# Patient Record
Sex: MALE | Race: WHITE | Employment: OTHER | URBAN - METROPOLITAN AREA
[De-identification: names, ages, dates, MRNs, and addresses within clinical notes are randomized per-mention and may not be internally consistent; named-entity substitution may affect disease eponyms.]

---

## 2017-01-03 ENCOUNTER — RADIANT APPOINTMENT (OUTPATIENT)
Dept: GENERAL RADIOLOGY | Facility: CLINIC | Age: 57
End: 2017-01-03
Attending: ORTHOPAEDIC SURGERY
Payer: COMMERCIAL

## 2017-01-03 ENCOUNTER — OFFICE VISIT (OUTPATIENT)
Dept: ORTHOPEDICS | Facility: CLINIC | Age: 57
End: 2017-01-03
Payer: COMMERCIAL

## 2017-01-03 VITALS — WEIGHT: 233 LBS | BODY MASS INDEX: 31.56 KG/M2 | HEIGHT: 72 IN | TEMPERATURE: 98.4 F

## 2017-01-03 DIAGNOSIS — M16.12 PRIMARY OSTEOARTHRITIS OF LEFT HIP: ICD-10-CM

## 2017-01-03 DIAGNOSIS — M16.12 PRIMARY OSTEOARTHRITIS OF LEFT HIP: Primary | ICD-10-CM

## 2017-01-03 PROCEDURE — 99207 ZZC PREOP VISIT IN GLOBAL PKG: CPT | Performed by: PHYSICIAN ASSISTANT

## 2017-01-03 PROCEDURE — 73502 X-RAY EXAM HIP UNI 2-3 VIEWS: CPT | Mod: TC

## 2017-01-03 RX ORDER — GABAPENTIN 300 MG/1
CAPSULE ORAL
Qty: 30 CAPSULE | Refills: 1 | Status: SHIPPED | OUTPATIENT
Start: 2017-01-03 | End: 2017-01-16

## 2017-01-03 ASSESSMENT — PAIN SCALES - GENERAL: PAINLEVEL: NO PAIN (0)

## 2017-01-03 NOTE — MR AVS SNAPSHOT
After Visit Summary   1/3/2017    Ethan Espinosa    MRN: [id number]           Patient Information     Date Of Birth          1960        Visit Information        Provider Department      1/3/2017 8:20 AM Florentino Lawrence MD [facility]        Today's Diagnoses     Primary osteoarthritis of left hip    -  1        Follow-ups after your visit        Your next 10 appointments already scheduled     Jan 09, 2017   Procedure with Florentino Lawrence MD   [facility] ([facility])    [address] [place] [id number] [phone]           From [address]: Exit at [employer] on south side of [place]. Turn right on [address]. Turn left at stoplight on [address]. [facility] will be in view two blocks ahead            Jan 23, 2017  1:20 PM   Return Visit with Florentino Lawrence MD   [facility] ([facility]    [address] [place] [id number] [phone]            Jan 27, 2017  1:50 PM   SHORT with Gregory G Schoen, MD   [facility] ([facility])    [address] [place] [id number] [phone]              Who to contact     If you have questions or need follow up information about today's clinic visit or your schedule please contact [facility] directly at [phone].  Normal or non-critical lab and imaging results will be communicated to you by MyChart, letter or phone within 4 business days after the clinic has received the results. If you do not hear from us within 7 days, please contact the clinic through MyChart or phone. If you have a critical or abnormal lab result, we will notify you by phone as soon as possible.  Submit refill requests through [email or web address] or call your pharmacy and they will forward the refill request to us. Please allow 3 business days for your refill to be completed.           "Additional Information About Your Visit        MyChart Information     [employer] lets you send messages to your doctor, view your test results, renew your prescriptions, schedule appointments and more. To sign up, go to [email or web address] . Click on \"Log in\" on the left side of the screen, which will take you to the Welcome page. Then click on \"Sign up Now\" on the right side of the page.     You will be asked to enter the access code listed below, as well as some personal information. Please follow the directions to create your username and password.     Your access code is: [email or web address]  Expires: 2017  2:09 PM     Your access code will  in 90 days. If you need help or a new code, please call your [place] clinic or [phone].        Your Vitals Were     Temperature Height BMI (Body Mass Index)             98.4  F (36.9  C) (Temporal) 1.829 m (6') 31.59 kg/m2          Blood Pressure from Last 3 Encounters:   16 160/90   16 169/86   02/25/15 164/102    Weight from Last 3 Encounters:   17 105.688 kg (233 lb)   16 105.96 kg (233 lb 9.6 oz)   16 110.224 kg (243 lb)               Primary Care Provider    None Specified       No primary provider on file.        Thank you!     Thank you for choosing [facility]  for your care. Our goal is always to provide you with excellent care. Hearing back from our patients is one way we can continue to improve our services. Please take a few minutes to complete the written survey that you may receive in the mail after your visit with us. Thank you!             Your Updated Medication List - Protect others around you: Learn how to safely use, store and throw away your medicines at www.disposemymeds.org.          This list is accurate as of: 1/3/17  8:24 AM.  Always use your most recent med list.                   Brand Name Dispense Instructions for use    amLODIPine 5 MG tablet    NORVASC    90 tablet    Take 1 tablet (5 " mg) by mouth daily       ANDROGEL 1% PUMP 12.5 MG/ACT (1%) gel   Generic drug:  testosterone     1 Month    Apply 1gm every morning       ibuprofen 600 MG tablet    ADVIL/MOTRIN    90 tablet    Take 1 tablet (600 mg) by mouth 3 times daily Take for 10 days consistently then PRN thereafter.       meloxicam 7.5 MG tablet    MOBIC    30 tablet    Take 1 tablet (7.5 mg) by mouth daily       * order for DME      1 Device Respironics Auto CPAP @@ 7-12 cm via nasal mask per order of Dr. Headley.       * order for DME     1 unit marking on an U-100 insulin syringe    Equipment being ordered: TENS       sildenafil 100 MG cap/tab    VIAGRA    4 tablet    Take 1 tablet (100 mg) by mouth daily as needed for erectile dysfunction Take 30 min to 4 hours before intercourse.  Never use with nitroglycerin, terazosin or doxazosin.       traMADol 50 MG tablet    ULTRAM    40 tablet    Take 1-2 tablets ( mg) by mouth every 6 hours as needed for breakthrough pain or pain       VITAMIN D (CHOLECALCIFEROL) PO      Take 1,000 Units by mouth daily       * Notice:  This list has 2 medication(s) that are the same as other medications prescribed for you. Read the directions carefully, and ask your doctor or other care provider to review them with you.

## 2017-01-03 NOTE — NURSING NOTE
Chief Complaint   Patient presents with     RECHECK     1 week prior to total Left hip.  DOS: Planned for 1/9/17       Initial Temp(Src) 98.4  F (36.9  C) (Temporal)  Ht 1.829 m (6')  Wt 105.688 kg (233 lb)  BMI 31.59 kg/m2 Estimated body mass index is 31.59 kg/(m^2) as calculated from the following:    Height as of this encounter: 1.829 m (6').    Weight as of this encounter: 105.688 kg (233 lb).  BP completed using cuff size: NA (Not Taken)  VADIM Grant

## 2017-01-03 NOTE — PROGRESS NOTES
HISTORY OF PRESENT ILLNESS:    Ethan Espionsa is a 56 year old male who is seen in follow up for   Chief Complaint   Patient presents with     RECHECK     1 week prior to total Left hip.  DOS: Planned for 1/9/17   Present symptoms: Patient here for visit prior to total joint.  He has attended the joint replacement education program.  Patient did state he is cut some swelling of the right knee that he has been utilizing a brace for.  Patient does have questions about infection control as he did have a previous back surgery in which got infected.  Treatments tried to this point: Pain medications.  Patient evaluation done with Dr. Lawrence    Physical Exam:  Vitals: Temp(Src) 98.4  F (36.9  C) (Temporal)  Ht 1.829 m (6')  Wt 105.688 kg (233 lb)  BMI 31.59 kg/m2  BMI= Body mass index is 31.59 kg/(m^2).  Constitutional: healthy, alert and no acute distress   Psychiatric: mentation appears normal and affect normal/bright  NEURO: no focal deficits  SKIN: no excoriation or erythema. No signs of infection.  JOINT/EXTREMITIES:  Affected extremity pulses are easily palpable.  Left hip  Patient is sitting on the edge of the exam table with legs extended. He has a difficult time sitting in 90  flexion in a chair.  GAIT: Antalgic    IMAGING INTERPRETATION:  X-ray images do reveal narrowing of the acetabulum to the femoral head  Independent visualization of the images was performed.     LAB:  Nares, CBC and UA were all reviewed. There is no bacteria present.   There is blood present in the urinary analysis.    ASSESSMENT:    Chief Complaint   Patient presents with     RECHECK     1 week prior to total Left hip.  DOS: Planned for 1/9/17       ICD-10-CM    1. Primary osteoarthritis of left hip M16.12 XR Hip Left 2-3 Views     gabapentin (NEURONTIN) 300 MG capsule       Patient is 1 week prior to left total hip arthroplasty..  He has completed the requirements necessary to proceed.    Plan:   Patient is advised of negative lab  results for bacteria. He is also advised of the use of cleaning of the room and space suits that are utilized within the surgery. Patient also has instruction for multiple showering prior to his surgery  Medications needed prior to surgery include: Gabapentin and meloxicam to begin Thursday prior to surgery  Patient states for postop pain control oxycodone 5 mg works better than Norco. The plain oxycodone also works a little better than Percocet with the Tylenol already in it  Patient with sleep apnea and utilizes C Pap machine. Patient is asked to bring that C Pap machine in for his hospitalization  DVT prophylaxis will be:  aspirin 325mg    Patient has been informed of therapy at home for 2 weeks following hospital discharge, than will attend outpatient therapy if needed   Patient plans for discharge to:  Home with support     Patient was given opportunities for questions.  Next office follow up will be 10-14 days from discharge from hospital.    Sherry Moreno PA-C   1/3/2017  8:58 AM    I didn't specifically address his use of C Pap. He did not require following his spine surgery, he did not require following his previous knee arthroscopy. For these reasons I did not think it was necessary for him to bring C Pap with him to the hospital.    I attest I have seen and evaluated the patient.  I agree with above impression and plan.    Florentino Lawrence MD

## 2017-01-09 ENCOUNTER — APPOINTMENT (OUTPATIENT)
Dept: PHYSICAL THERAPY | Facility: CLINIC | Age: 57
DRG: 470 | End: 2017-01-09
Attending: ORTHOPAEDIC SURGERY
Payer: COMMERCIAL

## 2017-01-09 ENCOUNTER — ANESTHESIA EVENT (OUTPATIENT)
Dept: SURGERY | Facility: CLINIC | Age: 57
DRG: 470 | End: 2017-01-09
Payer: COMMERCIAL

## 2017-01-09 ENCOUNTER — APPOINTMENT (OUTPATIENT)
Dept: GENERAL RADIOLOGY | Facility: CLINIC | Age: 57
DRG: 470 | End: 2017-01-09
Attending: ORTHOPAEDIC SURGERY
Payer: COMMERCIAL

## 2017-01-09 ENCOUNTER — HOSPITAL ENCOUNTER (OUTPATIENT)
Dept: GENERAL RADIOLOGY | Facility: CLINIC | Age: 57
DRG: 470 | End: 2017-01-09
Attending: ORTHOPAEDIC SURGERY | Admitting: ORTHOPAEDIC SURGERY
Payer: COMMERCIAL

## 2017-01-09 ENCOUNTER — ANESTHESIA (OUTPATIENT)
Dept: SURGERY | Facility: CLINIC | Age: 57
DRG: 470 | End: 2017-01-09
Payer: COMMERCIAL

## 2017-01-09 DIAGNOSIS — M25.552 PAIN OF LEFT HIP JOINT: ICD-10-CM

## 2017-01-09 PROBLEM — Z96.649 S/P TOTAL HIP ARTHROPLASTY: Status: ACTIVE | Noted: 2017-01-09

## 2017-01-09 PROCEDURE — 25800025 ZZH RX 258: Performed by: NURSE ANESTHETIST, CERTIFIED REGISTERED

## 2017-01-09 PROCEDURE — 25000125 ZZHC RX 250: Performed by: NURSE ANESTHETIST, CERTIFIED REGISTERED

## 2017-01-09 PROCEDURE — 25000125 ZZHC RX 250: Performed by: PHYSICIAN ASSISTANT

## 2017-01-09 PROCEDURE — S0077 INJECTION, CLINDAMYCIN PHOSP: HCPCS | Performed by: PHYSICIAN ASSISTANT

## 2017-01-09 PROCEDURE — 40000940 XR PELVIS PORT 1/2 VW

## 2017-01-09 PROCEDURE — 40000940 XR PELVIS AND HIP PORTABLE LEFT 1 VIEW

## 2017-01-09 RX ORDER — FENTANYL CITRATE 50 UG/ML
INJECTION, SOLUTION INTRAMUSCULAR; INTRAVENOUS PRN
Status: DISCONTINUED | OUTPATIENT
Start: 2017-01-09 | End: 2017-01-09

## 2017-01-09 RX ORDER — PROPOFOL 10 MG/ML
INJECTION, EMULSION INTRAVENOUS PRN
Status: DISCONTINUED | OUTPATIENT
Start: 2017-01-09 | End: 2017-01-09

## 2017-01-09 RX ORDER — GLYCOPYRROLATE 0.2 MG/ML
INJECTION, SOLUTION INTRAMUSCULAR; INTRAVENOUS PRN
Status: DISCONTINUED | OUTPATIENT
Start: 2017-01-09 | End: 2017-01-09

## 2017-01-09 RX ORDER — ONDANSETRON 2 MG/ML
INJECTION INTRAMUSCULAR; INTRAVENOUS PRN
Status: DISCONTINUED | OUTPATIENT
Start: 2017-01-09 | End: 2017-01-09

## 2017-01-09 RX ORDER — NEOSTIGMINE METHYLSULFATE 1 MG/ML
VIAL (ML) INJECTION PRN
Status: DISCONTINUED | OUTPATIENT
Start: 2017-01-09 | End: 2017-01-09

## 2017-01-09 RX ORDER — EPHEDRINE SULFATE 50 MG/ML
INJECTION, SOLUTION INTRAMUSCULAR; INTRAVENOUS; SUBCUTANEOUS PRN
Status: DISCONTINUED | OUTPATIENT
Start: 2017-01-09 | End: 2017-01-09

## 2017-01-09 RX ADMIN — PROPOFOL 50 MG: 10 INJECTION, EMULSION INTRAVENOUS at 10:28

## 2017-01-09 RX ADMIN — CLINDAMYCIN PHOSPHATE 900 MG: 18 INJECTION, SOLUTION INTRAVENOUS at 10:30

## 2017-01-09 RX ADMIN — MIDAZOLAM HYDROCHLORIDE 2 MG: 1 INJECTION, SOLUTION INTRAMUSCULAR; INTRAVENOUS at 10:19

## 2017-01-09 RX ADMIN — Medication 10 MG: at 12:12

## 2017-01-09 RX ADMIN — FENTANYL CITRATE 100 MCG: 50 INJECTION, SOLUTION INTRAMUSCULAR; INTRAVENOUS at 12:44

## 2017-01-09 RX ADMIN — Medication 5 MG: at 11:28

## 2017-01-09 RX ADMIN — SODIUM CHLORIDE, POTASSIUM CHLORIDE, SODIUM LACTATE AND CALCIUM CHLORIDE: 600; 310; 30; 20 INJECTION, SOLUTION INTRAVENOUS at 11:24

## 2017-01-09 RX ADMIN — Medication 10 MG: at 11:31

## 2017-01-09 RX ADMIN — ONDANSETRON 4 MG: 2 INJECTION INTRAMUSCULAR; INTRAVENOUS at 12:16

## 2017-01-09 RX ADMIN — HYDROMORPHONE HYDROCHLORIDE 0.5 MG: 10 INJECTION, SOLUTION INTRAMUSCULAR; INTRAVENOUS; SUBCUTANEOUS at 12:42

## 2017-01-09 RX ADMIN — FENTANYL CITRATE 50 MCG: 50 INJECTION, SOLUTION INTRAMUSCULAR; INTRAVENOUS at 10:46

## 2017-01-09 RX ADMIN — HYDROMORPHONE HYDROCHLORIDE 0.5 MG: 10 INJECTION, SOLUTION INTRAMUSCULAR; INTRAVENOUS; SUBCUTANEOUS at 12:25

## 2017-01-09 RX ADMIN — ROCURONIUM BROMIDE 50 MG: 10 INJECTION INTRAVENOUS at 10:29

## 2017-01-09 RX ADMIN — FENTANYL CITRATE 100 MCG: 50 INJECTION, SOLUTION INTRAMUSCULAR; INTRAVENOUS at 11:23

## 2017-01-09 RX ADMIN — GLYCOPYRROLATE 0.8 MG: 0.2 INJECTION, SOLUTION INTRAMUSCULAR; INTRAVENOUS at 12:28

## 2017-01-09 RX ADMIN — PROPOFOL 50 MG: 10 INJECTION, EMULSION INTRAVENOUS at 10:29

## 2017-01-09 RX ADMIN — PROPOFOL 200 MG: 10 INJECTION, EMULSION INTRAVENOUS at 10:27

## 2017-01-09 RX ADMIN — NEOSTIGMINE METHYLSULFATE 5 MG: 1 INJECTION INTRAMUSCULAR; INTRAVENOUS; SUBCUTANEOUS at 12:28

## 2017-01-09 RX ADMIN — FENTANYL CITRATE 50 MCG: 50 INJECTION, SOLUTION INTRAMUSCULAR; INTRAVENOUS at 10:27

## 2017-01-09 NOTE — ANESTHESIA PREPROCEDURE EVALUATION
Anesthesia Evaluation     . Pt has had prior anesthetic. Type: General      ROS/MED HX    ENT/Pulmonary:     (+)sleep apnea, uses CPAP , . .    Neurologic:  - neg neurologic ROS     Cardiovascular:     (+) hypertension----. : . . . :. . Previous cardiac testing date:results:date: results:ECG reviewed date: results:SB date: results:          METS/Exercise Tolerance:     Hematologic:  - neg hematologic  ROS       Musculoskeletal:  - neg musculoskeletal ROS       GI/Hepatic:  - neg GI/hepatic ROS       Renal/Genitourinary:  - ROS Renal section negative       Endo:  - neg endo ROS   (+) Obesity, .      Psychiatric:  - neg psychiatric ROS       Infectious Disease:  - neg infectious disease ROS       Malignancy:      - no malignancy   Other:    - neg other ROS           Physical Exam  Normal systems: cardiovascular, pulmonary and dental    Airway   Mallampati: I  TM distance: >3 FB  Neck ROM: full    Dental     Cardiovascular   Rhythm and rate: regular and normal      Pulmonary    breath sounds clear to auscultation                    Anesthesia Plan      History & Physical Review  History and physical reviewed and following examination; no interval change.    ASA Status:  2 .    NPO Status:  > 8 hours    Plan for General and ETT with Intravenous and Propofol induction. Maintenance will be Inhalation.    PONV prophylaxis:  Ondansetron (or other 5HT-3) and Meclizine or Dimenhydrinate       Postoperative Care  Postoperative pain management:  IV analgesics and Oral pain medications.      Consents  Anesthetic plan, risks, benefits and alternatives discussed with:  Patient..                          .

## 2017-01-09 NOTE — ANESTHESIA CARE TRANSFER NOTE
Patient: Ethan Espinosa    ARTHROPLASTY HIP (Left Hip)  Additional InformationProcedure(s):  Left total hip Arthroplasty - Wound Class: I-Clean    Diagnosis: arthralgia of hip, unspecified laterality  Diagnosis Additional Information: No value filed.    Anesthesia Type:   General     Note:  Airway :Nasal Cannula  Patient transferred to:PACU        Vitals: (Last set prior to Anesthesia Care Transfer)              Electronically Signed By: YAZMIN Shipley CRNA  January 9, 2017  12:50 PM

## 2017-01-10 ENCOUNTER — APPOINTMENT (OUTPATIENT)
Dept: OCCUPATIONAL THERAPY | Facility: CLINIC | Age: 57
DRG: 470 | End: 2017-01-10
Attending: ORTHOPAEDIC SURGERY
Payer: COMMERCIAL

## 2017-01-10 ENCOUNTER — APPOINTMENT (OUTPATIENT)
Dept: PHYSICAL THERAPY | Facility: CLINIC | Age: 57
DRG: 470 | End: 2017-01-10
Attending: ORTHOPAEDIC SURGERY
Payer: COMMERCIAL

## 2017-01-10 NOTE — ANESTHESIA POSTPROCEDURE EVALUATION
Patient: Ethan Espinosa    ARTHROPLASTY HIP (Left Hip)  Additional InformationProcedure(s):  Left total hip Arthroplasty - Wound Class: I-Clean    Diagnosis:arthralgia of hip, unspecified laterality  Diagnosis Additional Information: No value filed.    Anesthesia Type:  General    Note:  Anesthesia Post Evaluation    Patient location during evaluation: Bedside  Patient participation: Able to fully participate in evaluation  Level of consciousness: awake and alert  Pain management: adequate  Airway patency: patent  Cardiovascular status: acceptable  Respiratory status: acceptable  Hydration status: acceptable  PONV: none     Anesthetic complications: None          Last vitals:  Filed Vitals:    01/09/17 2338 01/10/17 0345 01/10/17 0722   BP: 129/65 132/78 139/57   Pulse: 70 69 64   Temp: 98  F (36.7  C) 97.7  F (36.5  C) 98  F (36.7  C)   Resp: 16 18 18   SpO2: 97% 97% 99%       Electronically Signed By: YAZMIN Gayle CRNA  January 10, 2017  4:06 PM

## 2017-01-11 ENCOUNTER — TELEPHONE (OUTPATIENT)
Dept: FAMILY MEDICINE | Facility: CLINIC | Age: 57
End: 2017-01-11

## 2017-01-11 NOTE — TELEPHONE ENCOUNTER
Pt is to follow up with Dr Larwence related to this hospital stay. Will close encounter and follow up as needed. Pascale [place] RN, BSN

## 2017-01-11 NOTE — TELEPHONE ENCOUNTER
Inpatient Visit Date: 1/10/17, [facility]  Diagnosis / Reason for Visit: Arthralgia of hip, unspecified laterality, S/P total hip arthroplasty

## 2017-01-12 ENCOUNTER — TELEPHONE (OUTPATIENT)
Dept: ORTHOPEDICS | Facility: CLINIC | Age: 57
End: 2017-01-12

## 2017-01-12 NOTE — TELEPHONE ENCOUNTER
Reason for Call:  Other FYI    Detailed comments: CAN NOT DO START UP CARE UNTIL TOMORROW. PATIENT IS NOT PLOWED OUT, UNTIL TOMORROW. NO NEED TO CALL.    Phone Number Patient can be reached at: Other phone number:  [phone]*    Best Time: NA    Can we leave a detailed message on this number? YES    Call taken on 1/12/2017 at 3:42 PM by Nina Armijo

## 2017-01-16 ENCOUNTER — OFFICE VISIT (OUTPATIENT)
Dept: ORTHOPEDICS | Facility: CLINIC | Age: 57
End: 2017-01-16
Payer: COMMERCIAL

## 2017-01-16 ENCOUNTER — TELEPHONE (OUTPATIENT)
Dept: ORTHOPEDICS | Facility: CLINIC | Age: 57
End: 2017-01-16

## 2017-01-16 VITALS — WEIGHT: 233 LBS | TEMPERATURE: 96.8 F | BODY MASS INDEX: 31.56 KG/M2 | HEIGHT: 72 IN

## 2017-01-16 DIAGNOSIS — R60.9 EDEMA, UNSPECIFIED TYPE: ICD-10-CM

## 2017-01-16 DIAGNOSIS — R21 RASH, SKIN: ICD-10-CM

## 2017-01-16 DIAGNOSIS — Z96.642 STATUS POST TOTAL REPLACEMENT OF LEFT HIP: Primary | ICD-10-CM

## 2017-01-16 LAB
BASOPHILS # BLD AUTO: 0 10E9/L (ref 0–0.2)
BASOPHILS NFR BLD AUTO: 0.4 %
CRP SERPL-MCNC: 55.4 MG/L (ref 0–8)
DIFFERENTIAL METHOD BLD: ABNORMAL
EOSINOPHIL # BLD AUTO: 0.4 10E9/L (ref 0–0.7)
EOSINOPHIL NFR BLD AUTO: 4 %
ERYTHROCYTE [DISTWIDTH] IN BLOOD BY AUTOMATED COUNT: 12.7 % (ref 10–15)
ERYTHROCYTE [SEDIMENTATION RATE] IN BLOOD BY WESTERGREN METHOD: 47 MM/H (ref 0–20)
HCT VFR BLD AUTO: 36.1 % (ref 40–53)
HGB BLD-MCNC: 12 G/DL (ref 13.3–17.7)
IMM GRANULOCYTES # BLD: 0 10E9/L (ref 0–0.4)
IMM GRANULOCYTES NFR BLD: 0.3 %
LYMPHOCYTES # BLD AUTO: 2 10E9/L (ref 0.8–5.3)
LYMPHOCYTES NFR BLD AUTO: 20.8 %
MCH RBC QN AUTO: 29.3 PG (ref 26.5–33)
MCHC RBC AUTO-ENTMCNC: 33.2 G/DL (ref 31.5–36.5)
MCV RBC AUTO: 88 FL (ref 78–100)
MONOCYTES # BLD AUTO: 0.6 10E9/L (ref 0–1.3)
MONOCYTES NFR BLD AUTO: 6.4 %
NEUTROPHILS # BLD AUTO: 6.7 10E9/L (ref 1.6–8.3)
NEUTROPHILS NFR BLD AUTO: 68.1 %
PLATELET # BLD AUTO: 397 10E9/L (ref 150–450)
RBC # BLD AUTO: 4.1 10E12/L (ref 4.4–5.9)
WBC # BLD AUTO: 9.8 10E9/L (ref 4–11)

## 2017-01-16 PROCEDURE — 36415 COLL VENOUS BLD VENIPUNCTURE: CPT | Performed by: ORTHOPAEDIC SURGERY

## 2017-01-16 PROCEDURE — 99024 POSTOP FOLLOW-UP VISIT: CPT | Performed by: PHYSICIAN ASSISTANT

## 2017-01-16 PROCEDURE — 85652 RBC SED RATE AUTOMATED: CPT | Performed by: ORTHOPAEDIC SURGERY

## 2017-01-16 PROCEDURE — 85025 COMPLETE CBC W/AUTO DIFF WBC: CPT | Performed by: ORTHOPAEDIC SURGERY

## 2017-01-16 PROCEDURE — 86140 C-REACTIVE PROTEIN: CPT | Performed by: ORTHOPAEDIC SURGERY

## 2017-01-16 RX ORDER — TRIAMCINOLONE ACETONIDE 1 MG/G
CREAM TOPICAL
Qty: 30 G | Refills: 3 | Status: SHIPPED | OUTPATIENT
Start: 2017-01-16

## 2017-01-16 RX ORDER — HYDROMORPHONE HYDROCHLORIDE 2 MG/1
2-4 TABLET ORAL EVERY 4 HOURS PRN
Qty: 60 TABLET | Refills: 0 | Status: SHIPPED | OUTPATIENT
Start: 2017-01-16 | End: 2017-02-21

## 2017-01-16 RX ORDER — HYDROXYZINE HYDROCHLORIDE 25 MG/1
25-50 TABLET, FILM COATED ORAL EVERY 6 HOURS PRN
Qty: 60 TABLET | Refills: 1 | Status: SHIPPED | OUTPATIENT
Start: 2017-01-16 | End: 2017-02-21

## 2017-01-16 ASSESSMENT — PAIN SCALES - GENERAL: PAINLEVEL: MODERATE PAIN (4)

## 2017-01-16 NOTE — PROGRESS NOTES
HISTORY OF PRESENT ILLNESS:    Ethan Espinosa is a 56 year old male who is seen in follow up for   Chief Complaint   Patient presents with     RECHECK     Left total hip Arthroplasty.  DOS: 1/9/17 (7 days postop)     Surgical Followup     PREOPERATIVE DIAGNOSIS:  Left hip degenerative arthritis.  POSTOPERATIVE DIAGNOSIS:  Left hip degenerative arthritis.  PROCEDURE:  Left total hip arthroplasty using [place] Tritanium cup 58 mm fixed with a single 6.5 mm screw; Accolade 2 Size 5 stem 130 degree angle neck; 36 mm Biolox delta head and a 0-degree polyethylene liner.  Posterior approach was utilized.   Interval: patient reports he stopped use of gabapentin and meloxicam this past Saturday thinking this might have contributed to his rash. Patient reports his rash started just a few days ago. He had some triamcinolone cream at home that he is placed on 1 arm for testing.He did notice that the rash did get better with the use of this cream. Patient has been utilizing the pain medications approximately 4 times daily but at 2 tablets per event for total of 8 per day.  Family present: wife  Patient evaluation done with Dr. Lawrence    Physical Exam:  Vitals: Temp(Src) 96.8  F (36  C) (Temporal)  Ht 1.829 m (6')  Wt 105.688 kg (233 lb)  BMI 31.59 kg/m2  BMI= Body mass index is 31.59 kg/(m^2).  Constitutional: healthy, alert and no acute distress   Psychiatric: mentation appears normal and affect normal/bright  NEURO: no focal deficits  Skin of the inside of both arms, para area, inner thighs and outer thighs along with the abdomen and sides of the trunk have a raised reddened rash that appears consistent with allergy to medication  Location of surgery:  left  Hip.  Aquasol was removed.There is a small amount of redness in the distribution of the Aquasol that was removed especially at the lower portion. There is no redness mid incision. Steri-Strips are 50% intact.  Incision is intact.  Gait:  Patient is able to walk  without the canes but he is slow and methodical primarily due to irritation from his rash.  Patient does use bilateral teds. He does have a small amount of dependent swelling into the left ankle and left lower extremity    CBC, sedimentation rate, CRP were drawn today. White count from the CBC is within normal limits. CBC and sedimentation rate are not back yet     ASSESSMENT:    Chief Complaint   Patient presents with     RECHECK     Left total hip Arthroplasty.  DOS: 1/9/17 (7 days postop)     Surgical Followup     PREOPERATIVE DIAGNOSIS:  Left hip degenerative arthritis.  POSTOPERATIVE DIAGNOSIS:  Left hip degenerative arthritis.  PROCEDURE:  Left total hip arthroplasty using [place] Tritanium cup 58 mm fixed with a single 6.5 mm screw; Accolade 2 Size 5 stem 130 degree angle neck; 36 mm Biolox delta head and a 0-degree polyethylene liner.  Posterior approach was utilized.       ICD-10-CM    1. Status post total replacement of left hip Z96.642 CBC with platelets differential     Erythrocyte sedimentation rate auto     CRP inflammation     CBC with platelets differential     Erythrocyte sedimentation rate auto     CRP inflammation     hydrOXYzine (ATARAX) 25 MG tablet     triamcinolone (KENALOG) 0.1 % cream     HYDROmorphone (DILAUDID) 2 MG tablet   2. Rash, skin R21 CBC with platelets differential     Erythrocyte sedimentation rate auto     CRP inflammation     CBC with platelets differential     Erythrocyte sedimentation rate auto     CRP inflammation     hydrOXYzine (ATARAX) 25 MG tablet     triamcinolone (KENALOG) 0.1 % cream     HYDROmorphone (DILAUDID) 2 MG tablet   3. Edema, unspecified type R60.9 CBC with platelets differential     Erythrocyte sedimentation rate auto     CRP inflammation     CBC with platelets differential     Erythrocyte sedimentation rate auto     CRP inflammation     Patient is 7 days postop left total hip arthroplasty.  Patient has developed a painful raised red rash consistent with   Allergic reaction to medication    Plan:   suspect patient's rashes likely due to oxycodone.  He is given alternative narcotic pain medication of Dilaudid  Patient can continue discontinued use of the gabapentin and meloxicam  Triamcinolone cream was refilled.  Atarax is also ordered  Return to clinic :  Keep next week's appointment to answer any additional questions. If patient is doing very well he can follow up in 6 weeks postoperatively at which time we would then take x-rays of the hip    Sherry Moreno PA-C   1/16/2017  1:49 PM      I attest I have seen and evaluated the patient.  I agree with above impression and plan.    Florentino Lawrence MD    WBC:  Normal    CRP:   55 at 7 days post op     Sed Rate :  47 at 7 days post op    Will re assess clinically in 1 week

## 2017-01-16 NOTE — TELEPHONE ENCOUNTER
"Pt had Left SANDRA 1/9/17 with Dr. Lawrence. PT was DC'd from hospital on 1/10/17. Pt states that he started having a rash on his body while he was in the hospital. He had not told anyone about this. The rash has been getting worse in that it is more itchy and some are fluid filled. Located on both arms, around waist, periarea, thighs. He did take a shower today with soap for itching and that has helped. He stopped his Gabepentin and Meloxicam on the 1/14/17. He continues to use Oxycodone and ASA. Pt has had not good experiences with Benadryl in the past, he feels \"spacy\"  States that his left leg, calf and ankle are really swollen. Hx of this in his family, but he has not hx. The right side is normal. Wearing teds, elevating leg above his heart and icing, doing ankle pumps.  I told pt we would talk to Dr. Lawrence but then decided to just have pt come in today. I instructed him to try this soap he or an oatmeal soap and just lay the rag over the rash areas, not to take the Benadryl since he has had some hx with this. KELLY Oneill     "

## 2017-01-16 NOTE — NURSING NOTE
Chief Complaint   Patient presents with     RECHECK     Left total hip Arthroplasty.  DOS: 1/9/17 (7 days postop)     Surgical Followup     PREOPERATIVE DIAGNOSIS:  Left hip degenerative arthritis.  POSTOPERATIVE DIAGNOSIS:  Left hip degenerative arthritis.  PROCEDURE:  Left total hip arthroplasty using [place] Tritanium cup 58 mm fixed with a single 6.5 mm screw; Accolade 2 Size 5 stem 130 degree angle neck; 36 mm Biolox delta head and a 0-degree polyethylene liner.  Posterior approach was utilized.       Initial Temp(Src) 96.8  F (36  C) (Temporal)  Ht 1.829 m (6')  Wt 105.688 kg (233 lb)  BMI 31.59 kg/m2 Estimated body mass index is 31.59 kg/(m^2) as calculated from the following:    Height as of this encounter: 1.829 m (6').    Weight as of this encounter: 105.688 kg (233 lb).  BP completed using cuff size: NA (Not Taken)  VADIM Grant

## 2017-01-16 NOTE — MR AVS SNAPSHOT
After Visit Summary   1/16/2017    Ethan Espinosa    MRN: [id number]           Patient Information     Date Of Birth          1960        Visit Information        Provider Department      1/16/2017 2:40 PM Florentino Lawrence MD [facility]        Today's Diagnoses     Status post total replacement of left hip    -  1     Rash, skin         Edema, unspecified type            Follow-ups after your visit        Your next 10 appointments already scheduled     Jan 16, 2017  2:40 PM   Return Visit with Florentino Lawrence MD   [facility] ([address] [phone]            Jan 23, 2017  1:20 PM   Return Visit with Florentino Lawrence MD   [facility] ([address] [phone]            Jan 27, 2017  1:50 PM   SHORT with Gregory G Schoen, MD   [facility] ([address] [phone]              Who to contact     If you have questions or need follow up information about today's clinic visit or your schedule please contact [facility] directly at [phone].  Normal or non-critical lab and imaging results will be communicated to you by "[employer]hart, letter or phone within 4 business days after the clinic has received the results. If you do not hear from us within 7 days, please contact the clinic through "[employer]"hart or phone. If you have a critical or abnormal lab result, we will notify you by phone as soon as possible.  Submit refill requests through [employer] or call your pharmacy and they will forward the refill request to us. Please allow 3 business days for your refill to be completed.          Additional Information About Your Visit        [employer] Information     [employer] lets you send messages to your doctor, view your test results,  "renew your prescriptions, schedule appointments and more. To sign up, go to [email or web address] . Click on \"Log in\" on the left side of the screen, which will take you to the Welcome page. Then click on \"Sign up Now\" on the right side of the page.     You will be asked to enter the access code listed below, as well as some personal information. Please follow the directions to create your username and password.     Your access code is: [email or web address]  Expires: 2017  2:09 PM     Your access code will  in 90 days. If you need help or a new code, please call your [facility] clinic or [phone].        Care EveryWhere ID     This is your Care EveryWhere ID. This could be used by other organizations to access your [facility] medical records  [id number]        Your Vitals Were     Temperature Height BMI (Body Mass Index)             96.8  F (36  C) (Temporal) 1.829 m (6') 31.59 kg/m2          Blood Pressure from Last 3 Encounters:   01/10/17 139/57   16 160/90   16 169/86    Weight from Last 3 Encounters:   17 105.688 kg (233 lb)   17 105.688 kg (233 lb)   17 105.688 kg (233 lb)              We Performed the Following     CBC with platelets differential     CRP inflammation     Erythrocyte sedimentation rate auto        Primary Care Provider Office Phone # Fax #    Gregory G Schoen, [phone]       [facility] [address] [place] [address]        Thank you!     Thank you for choosing [facility]  for your care. Our goal is always to provide you with excellent care. Hearing back from our patients is one way we can continue to improve our services. Please take a few minutes to complete the written survey that you may receive in the mail after your visit with us. Thank you!             Your Updated Medication List - Protect others around you: Learn how to safely use, store and throw away your medicines at " www.disposemymeds.org.          This list is accurate as of: 1/16/17  1:14 PM.  Always use your most recent med list.                   Brand Name Dispense Instructions for use    acetaminophen 325 MG tablet    TYLENOL    100 tablet    Take 2 tablets (650 mg) by mouth every 4 hours as needed for other (surgical pain)       amLODIPine 5 MG tablet    NORVASC    90 tablet    Take 1 tablet (5 mg) by mouth daily       ANDROGEL 1% PUMP 12.5 MG/ACT (1%) gel   Generic drug:  testosterone     1 Month    Apply 1gm every morning       aspirin - buffered 325 MG Tabs tablet    ASCRIPTIN    60 tablet    Take 1 tablet (325 mg) by mouth every 12 hours       * order for DME      1 Device Respironics Auto CPAP @@ 7-12 cm via nasal mask per order of Dr. Headley.       * order for DME     1 unit marking on an U-100 insulin syringe    Equipment being ordered: TENS       * order for DME     1 Device    Equipment being ordered: Walker () Treatment Diagnosis: s/p joint replacement       oxyCODONE 5 MG IR tablet    ROXICODONE    70 tablet    Take 1-2 tablets (5-10 mg) by mouth every 3 hours as needed for moderate to severe pain       senna-docusate 8.6-50 MG per tablet    SENOKOT-S;PERICOLACE    100 tablet    Take 1-2 tablets by mouth 2 times daily       sildenafil 100 MG cap/tab    VIAGRA    4 tablet    Take 1 tablet (100 mg) by mouth daily as needed for erectile dysfunction Take 30 min to 4 hours before intercourse.  Never use with nitroglycerin, terazosin or doxazosin.       VITAMIN D (CHOLECALCIFEROL) PO      Take 1,000 Units by mouth daily       * Notice:  This list has 3 medication(s) that are the same as other medications prescribed for you. Read the directions carefully, and ask your doctor or other care provider to review them with you.

## 2017-01-17 ENCOUNTER — TELEPHONE (OUTPATIENT)
Dept: ORTHOPEDICS | Facility: CLINIC | Age: 57
End: 2017-01-17

## 2017-01-17 NOTE — TELEPHONE ENCOUNTER
Reason for Call:  Other homecare    Detailed comments: Homecare nurse, Sherry, called to inform Dr Lawrence the patient was seen once and is doing well and has refused all further homecare and outpatient care.    Phone Number Patient can be reached at: Home number on file [phone] (home)    Best Time: any    Can we leave a detailed message on this number? YES    Call taken on 1/17/2017 at 8:35 AM by Danielle Smith

## 2017-01-18 DIAGNOSIS — G47.33 OSA (OBSTRUCTIVE SLEEP APNEA): Primary | ICD-10-CM

## 2017-01-26 DIAGNOSIS — R21 RASH, SKIN: ICD-10-CM

## 2017-01-26 DIAGNOSIS — Z96.642 STATUS POST TOTAL REPLACEMENT OF LEFT HIP: Primary | ICD-10-CM

## 2017-01-26 NOTE — TELEPHONE ENCOUNTER
Hydromorphone      Last Written Prescription Date: 1/16/17  Last Fill Quantity: 60,  # refills: 0   Last Office Visit with G, P or [facility] prescribing provider: 12/19/16                                         Next 5 appointments (look out 90 days)     Jan 27, 2017  1:50 PM   SHORT with Gregory G Schoen, MD   [facility] ([address] [address]   [phone]            Feb 21, 2017  8:00 AM   Return Visit with Florentino Lawrence MD   [facility] ([facility])    [address] [place] [address]   [phone]                Delmy Woodruff [facility]- Float Dept.

## 2017-01-27 ENCOUNTER — OFFICE VISIT (OUTPATIENT)
Dept: FAMILY MEDICINE | Facility: CLINIC | Age: 57
End: 2017-01-27
Payer: COMMERCIAL

## 2017-01-27 VITALS
RESPIRATION RATE: 16 BRPM | TEMPERATURE: 97.9 F | OXYGEN SATURATION: 98 % | BODY MASS INDEX: 30.51 KG/M2 | SYSTOLIC BLOOD PRESSURE: 158 MMHG | WEIGHT: 225 LBS | DIASTOLIC BLOOD PRESSURE: 94 MMHG | HEART RATE: 73 BPM

## 2017-01-27 DIAGNOSIS — I10 ESSENTIAL HYPERTENSION: Primary | ICD-10-CM

## 2017-01-27 DIAGNOSIS — Z96.642 STATUS POST TOTAL REPLACEMENT OF LEFT HIP: ICD-10-CM

## 2017-01-27 PROCEDURE — 99213 OFFICE O/P EST LOW 20 MIN: CPT | Performed by: FAMILY MEDICINE

## 2017-01-27 RX ORDER — HYDROMORPHONE HYDROCHLORIDE 2 MG/1
2-4 TABLET ORAL EVERY 4 HOURS PRN
Qty: 60 TABLET | Refills: 0 | OUTPATIENT
Start: 2017-01-27

## 2017-01-27 RX ORDER — OXYCODONE HYDROCHLORIDE 5 MG/1
5 TABLET ORAL EVERY 4 HOURS PRN
Qty: 60 TABLET | Refills: 0 | Status: SHIPPED | OUTPATIENT
Start: 2017-01-27 | End: 2017-02-14

## 2017-01-27 RX ORDER — LISINOPRIL 10 MG/1
10 TABLET ORAL DAILY
Qty: 30 TABLET | Refills: 1 | Status: SHIPPED | OUTPATIENT
Start: 2017-01-27 | End: 2017-06-09

## 2017-01-27 ASSESSMENT — PAIN SCALES - GENERAL: PAINLEVEL: NO PAIN (0)

## 2017-01-27 NOTE — MR AVS SNAPSHOT
"              After Visit Summary   2017    Ethan Espinosa    MRN: [id number]           Patient Information     Date Of Birth          1960        Visit Information        Provider Department      2017 1:50 PM Schoen, Gregory G, MD [facility]         Follow-ups after your visit        Your next 10 appointments already scheduled     2017  8:00 AM   Return Visit with Florentino Lawrence MD   [facility] ([facility]    [address] [place] [phone]              Who to contact     If you have questions or need follow up information about today's clinic visit or your schedule please contact [facility] directly at [phone].  Normal or non-critical lab and imaging results will be communicated to you by MyChart, letter or phone within 4 business days after the clinic has received the results. If you do not hear from us within 7 days, please contact the clinic through MyChart or phone. If you have a critical or abnormal lab result, we will notify you by phone as soon as possible.  Submit refill requests through [employer] or call your pharmacy and they will forward the refill request to us. Please allow 3 business days for your refill to be completed.          Additional Information About Your Visit        [employer] Information     [employer] lets you send messages to your doctor, view your test results, renew your prescriptions, schedule appointments and more. To sign up, go to [email or web address] . Click on \"Log in\" on the left side of the screen, which will take you to the Welcome page. Then click on \"Sign up Now\" on the right side of the page.     You will be asked to enter the access code listed below, as well as some personal information. Please follow the directions to create your username and password.     Your access code is: [email or web address]  Expires: 2017  2:09 PM     Your access code will  " in 90 days. If you need help or a new code, please call your [place] clinic or [phone].        Care EveryWhere ID     This is your Care EveryWhere ID. This could be used by other organizations to access your [place] medical records  [id number]        Your Vitals Were     Pulse Temperature Respirations Pulse Oximetry          73 97.9  F (36.6  C) (Tympanic) 16 98%         Blood Pressure from Last 3 Encounters:   01/27/17 158/94   01/10/17 139/57   12/22/16 160/90    Weight from Last 3 Encounters:   01/27/17 225 lb (102.059 kg)   01/16/17 233 lb (105.688 kg)   01/09/17 233 lb (105.688 kg)              Today, you had the following     No orders found for display       Primary Care Provider Office Phone # Fax #    Gregory G Schoen, [phone]       [facility] [address] [place] [address]        Thank you!     Thank you for choosing [facility]  for your care. Our goal is always to provide you with excellent care. Hearing back from our patients is one way we can continue to improve our services. Please take a few minutes to complete the written survey that you may receive in the mail after your visit with us. Thank you!             Your Updated Medication List - Protect others around you: Learn how to safely use, store and throw away your medicines at www.disposemymeds.org.          This list is accurate as of: 1/27/17  1:58 PM.  Always use your most recent med list.                   Brand Name Dispense Instructions for use    acetaminophen 325 MG tablet    TYLENOL    100 tablet    Take 2 tablets (650 mg) by mouth every 4 hours as needed for other (surgical pain)       amLODIPine 5 MG tablet    NORVASC    90 tablet    Take 1 tablet (5 mg) by mouth daily       ANDROGEL 1% PUMP 12.5 MG/ACT (1%) gel   Generic drug:  testosterone     1 Month    Apply 1gm every morning       aspirin - buffered 325 MG Tabs tablet    ASCRIPTIN    60 tablet    Take 1 tablet  (325 mg) by mouth every 12 hours       HYDROmorphone 2 MG tablet    DILAUDID    60 tablet    Take 1-2 tablets (2-4 mg) by mouth every 4 hours as needed for pain       hydrOXYzine 25 MG tablet    ATARAX    60 tablet    Take 1-2 tablets (25-50 mg) by mouth every 6 hours as needed for itching (rash)       * order for DME      1 Device Respironics Auto CPAP @@ 7-12 cm via nasal mask per order of Dr. Headley.       * order for DME     1 unit marking on an U-100 insulin syringe    Equipment being ordered: TENS       * order for DME     1 Device    Equipment being ordered: Walker () Treatment Diagnosis: s/p joint replacement       senna-docusate 8.6-50 MG per tablet    SENOKOT-S;PERICOLACE    100 tablet    Take 1-2 tablets by mouth 2 times daily       sildenafil 100 MG cap/tab    VIAGRA    4 tablet    Take 1 tablet (100 mg) by mouth daily as needed for erectile dysfunction Take 30 min to 4 hours before intercourse.  Never use with nitroglycerin, terazosin or doxazosin.       triamcinolone 0.1 % cream    KENALOG    30 g    Apply sparingly to affected area three times daily for 14 days or until rash cleared       VITAMIN D (CHOLECALCIFEROL) PO      Take 1,000 Units by mouth daily       * Notice:  This list has 3 medication(s) that are the same as other medications prescribed for you. Read the directions carefully, and ask your doctor or other care provider to review them with you.

## 2017-01-27 NOTE — NURSING NOTE
Chief Complaint   Patient presents with     Hypertension     recheck       Initial /94 mmHg  Pulse 73  Temp(Src) 97.9  F (36.6  C) (Tympanic)  Resp 16  Wt 225 lb (102.059 kg)  SpO2 98% Estimated body mass index is 30.51 kg/(m^2) as calculated from the following:    Height as of 1/16/17: 6' (1.829 m).    Weight as of this encounter: 225 lb (102.059 kg).  BP completed using cuff size: regular

## 2017-01-27 NOTE — PROGRESS NOTES
SUBJECTIVE:                                                    Ethan Espinosa is a 56 year old male who presents to clinic today for the following health issues:      Hypertension Follow-up      Outpatient blood pressures are not being checked.    Low Salt Diet: no added salt    States he is taking norvasc but after each dose has numbness/tingling in his fingers. He would prefer something different.  His pressures are not well tolerated on the norvasc as well.    He is doing well after his hip surgery on 1/9 and wishes to have me assess his wound.    Also needs pain meds for surgical recovery, using 4 oxycodone 5 mg daily.        Amount of exercise or physical activity: None    Problems taking medications regularly: No    Medication side effects: none    Diet: regular (no restrictions)    OBJECTIVE:  /94 mmHg  Pulse 73  Temp(Src) 97.9  F (36.6  C) (Tympanic)  Resp 16  Wt 225 lb (102.059 kg)  SpO2 98%  Alert and oriented, in no acute distress.  Pressure as noted.   Left hip incision is with some old dried blood on his steristrips which are mostly intact and quite adherent.    I did remove one in between where there was more of a brown blood stain and it is well healed under this and there is no induration or drainage noted.     ASSESSMENT:   Essential hypertension  Status post total replacement of left hip    PLAN:    His hip incision is healing well.  Will have him allow the steri strips to fall off on their own but let shower water run on them to loosen them up more.   Will switch to lisinopril 10 mg daily. Discussed side effects and will call. He will record bid pressures alternating times of the day that he takes them and report them back in two weeks.     Electronically signed by Greg Schoen, MD

## 2017-02-14 DIAGNOSIS — I10 ESSENTIAL HYPERTENSION: ICD-10-CM

## 2017-02-14 NOTE — TELEPHONE ENCOUNTER
Oxycodone 5mg      Last Written Prescription Date: 1/27/17  Last Fill Quantity: 60,  # refills: 0   Last Office Visit with G, P or [facility] prescribing provider: 1/27/17                                         Next 5 appointments (look out 90 days)     Feb 21, 2017  8:00 AM CST   [facility]   [facility]    [address] [place] [phone] [phone]                  [facility] Woodruff [facility]- Float Dept.

## 2017-02-17 RX ORDER — OXYCODONE HYDROCHLORIDE 5 MG/1
5 TABLET ORAL EVERY 4 HOURS PRN
Qty: 60 TABLET | Refills: 0 | Status: SHIPPED | OUTPATIENT
Start: 2017-02-17 | End: 2017-02-21

## 2017-02-21 ENCOUNTER — RADIANT APPOINTMENT (OUTPATIENT)
Dept: GENERAL RADIOLOGY | Facility: CLINIC | Age: 57
End: 2017-02-21
Attending: ORTHOPAEDIC SURGERY
Payer: COMMERCIAL

## 2017-02-21 ENCOUNTER — OFFICE VISIT (OUTPATIENT)
Dept: ORTHOPEDICS | Facility: CLINIC | Age: 57
End: 2017-02-21
Payer: COMMERCIAL

## 2017-02-21 VITALS — BODY MASS INDEX: 30.2 KG/M2 | HEIGHT: 72 IN | TEMPERATURE: 98.3 F | WEIGHT: 223 LBS

## 2017-02-21 DIAGNOSIS — I10 ESSENTIAL HYPERTENSION: ICD-10-CM

## 2017-02-21 DIAGNOSIS — Z96.642 STATUS POST TOTAL REPLACEMENT OF LEFT HIP: Primary | ICD-10-CM

## 2017-02-21 DIAGNOSIS — Z96.642 STATUS POST TOTAL REPLACEMENT OF LEFT HIP: ICD-10-CM

## 2017-02-21 DIAGNOSIS — M25.80 CALCIFICATION OF JOINT: ICD-10-CM

## 2017-02-21 PROCEDURE — 99024 POSTOP FOLLOW-UP VISIT: CPT | Performed by: PHYSICIAN ASSISTANT

## 2017-02-21 PROCEDURE — 73502 X-RAY EXAM HIP UNI 2-3 VIEWS: CPT | Mod: TC

## 2017-02-21 RX ORDER — INDOMETHACIN 25 MG/1
25 CAPSULE ORAL 2 TIMES DAILY WITH MEALS
Qty: 60 CAPSULE | Refills: 0 | Status: SHIPPED | OUTPATIENT
Start: 2017-02-21 | End: 2017-02-21

## 2017-02-21 RX ORDER — INDOMETHACIN 50 MG/1
50 CAPSULE ORAL 2 TIMES DAILY WITH MEALS
Qty: 60 CAPSULE | Refills: 0 | Status: SHIPPED | OUTPATIENT
Start: 2017-02-21 | End: 2017-08-01

## 2017-02-21 RX ORDER — OXYCODONE HYDROCHLORIDE 5 MG/1
5-10 TABLET ORAL EVERY 6 HOURS PRN
Qty: 60 TABLET | Refills: 0 | Status: SHIPPED | OUTPATIENT
Start: 2017-02-21 | End: 2017-03-14

## 2017-02-21 ASSESSMENT — PAIN SCALES - GENERAL: PAINLEVEL: MILD PAIN (3)

## 2017-02-21 NOTE — MR AVS SNAPSHOT
"              After Visit Summary   2017    Ethan Espinosa    MRN: [id number]           Patient Information     Date Of Birth          1960        Visit Information        Provider Department      2017 8:00 AM Florentino Lawrence MD [facility]        Today's Diagnoses     Status post total replacement of left hip    -  1       Follow-ups after your visit        Who to contact     If you have questions or need follow up information about today's clinic visit or your schedule please contact [facility] directly at [phone].  Normal or non-critical lab and imaging results will be communicated to you by [employer], letter or phone within 4 business days after the clinic has received the results. If you do not hear from us within 7 days, please contact the clinic through [employer] or phone. If you have a critical or abnormal lab result, we will notify you by phone as soon as possible.  Submit refill requests through [employer] or call your pharmacy and they will forward the refill request to us. Please allow 3 business days for your refill to be completed.          Additional Information About Your Visit        MyChart Information     [employer] lets you send messages to your doctor, view your test results, renew your prescriptions, schedule appointments and more. To sign up, go to [email or web address] . Click on \"Log in\" on the left side of the screen, which will take you to the Welcome page. Then click on \"Sign up Now\" on the right side of the page.     You will be asked to enter the access code listed below, as well as some personal information. Please follow the directions to create your username and password.     Your access code is: [email or web address]  Expires: 2017  8:04 AM     Your access code will  in 90 days. If you need help or a new code, please call your [facility] or [phone].        Care EveryWhere ID     This is your Care EveryWhere ID. This could " be used by other organizations to access your [place] medical records  [id number]        Your Vitals Were     Temperature Height BMI (Body Mass Index)             98.3  F (36.8  C) (Temporal) 1.829 m (6') 30.24 kg/m2          Blood Pressure from Last 3 Encounters:   01/27/17 (!) 158/94   01/10/17 139/57   12/22/16 160/90    Weight from Last 3 Encounters:   02/21/17 101.2 kg (223 lb)   01/27/17 102.1 kg (225 lb)   01/16/17 105.7 kg (233 lb)               Primary Care Provider Office Phone # Fax #    Gregory G Schoen, [phone]       [facility] [address] [place] [address]        Thank you!     Thank you for choosing [facility]  for your care. Our goal is always to provide you with excellent care. Hearing back from our patients is one way we can continue to improve our services. Please take a few minutes to complete the written survey that you may receive in the mail after your visit with us. Thank you!             Your Updated Medication List - Protect others around you: Learn how to safely use, store and throw away your medicines at www.disposemymeds.org.          This list is accurate as of: 2/21/17  8:04 AM.  Always use your most recent med list.                   Brand Name Dispense Instructions for use    ANDROGEL 1% PUMP 12.5 MG/ACT (1%) gel   Generic drug:  testosterone     1 Month    Apply 1gm every morning       lisinopril 10 MG tablet    PRINIVIL/ZESTRIL    30 tablet    Take 1 tablet (10 mg) by mouth daily       * order for DME      1 Device Respironics Auto CPAP @@ 7-12 cm via nasal mask per order of Dr. Headley.       * order for DME     1 unit marking on an U-100 insulin syringe    Equipment being ordered: TENS       oxyCODONE 5 MG IR tablet    ROXICODONE    60 tablet    Take 1 tablet (5 mg) by mouth every 4 hours as needed for pain maximum 6 tablet(s) per day       sildenafil 100 MG cap/tab    VIAGRA    4 tablet    Take 1 tablet (100  mg) by mouth daily as needed for erectile dysfunction Take 30 min to 4 hours before intercourse.  Never use with nitroglycerin, terazosin or doxazosin.       triamcinolone 0.1 % cream    KENALOG    30 g    Apply sparingly to affected area three times daily for 14 days or until rash cleared       VITAMIN D (CHOLECALCIFEROL) PO      Take 1,000 Units by mouth daily Reported on 2/21/2017       * Notice:  This list has 2 medication(s) that are the same as other medications prescribed for you. Read the directions carefully, and ask your doctor or other care provider to review them with you.

## 2017-02-21 NOTE — PROGRESS NOTES
HISTORY OF PRESENT ILLNESS:    Ethan Espinosa is a 56 year old male who is seen in follow up for   Chief Complaint   Patient presents with     RECHECK     Left total hip Arthroplasty.  DOS: 1/9/17 (6 weeks postop)     Surgical Followup     PREOPERATIVE DIAGNOSIS:  Left hip degenerative arthritis.  POSTOPERATIVE DIAGNOSIS:  Left hip degenerative arthritis.  PROCEDURE:  Left total hip arthroplasty using Stephan Tritanium cup 58 mm fixed with a single 6.5 mm screw; Accolade 2 Size 5 stem 130 degree angle neck; 36 mm Biolox delta head and a 0-degree polyethylene liner.  Posterior approach was utilized.   Interval: Patient states his left hip remains achy. He has been doing home exercises including squats. Patient also admits that he has been cutting wood.  He has not utilized any assistive walking devices for several weeks.  Patient evaluation done with Dr. Lawrence    Physical Exam:  Vitals: Temp 98.3  F (36.8  C) (Temporal)  Ht 1.829 m (6')  Wt 101.2 kg (223 lb)  BMI 30.24 kg/m2  BMI= Body mass index is 30.24 kg/(m^2).  Constitutional: healthy, alert and no acute distress   Psychiatric: mentation appears normal and affect normal/bright  NEURO: no focal deficits  Location of surgery:  Left hip  No redness or swelling.  Patient with tenderness over the trochanter.  Range of motion:  Patient can obtain 90  of flexion easily.    IMAGING INTERPRETATION:  X-ray images of the left hip reveal components remain well positioned. There is just a small amount of calcifications present.  Independent visualization of the images was performed.     ASSESSMENT:    Chief Complaint   Patient presents with     RECHECK     Left total hip Arthroplasty.  DOS: 1/9/17 (6 weeks postop)     Surgical Followup     PREOPERATIVE DIAGNOSIS:  Left hip degenerative arthritis.  POSTOPERATIVE DIAGNOSIS:  Left hip degenerative arthritis.  PROCEDURE:  Left total hip arthroplasty using Stephan Tritanium cup 58 mm fixed with a single 6.5 mm screw; Accolade  2 Size 5 stem 130 degree angle neck; 36 mm Biolox delta head and a 0-degree polyethylene liner.  Posterior approach was utilized.       ICD-10-CM    1. Status post total replacement of left hip Z96.642 XR Hip Left 2-3 Views     PHYSICAL THERAPY REFERRAL     indomethacin (INDOCIN) 50 MG capsule     DISCONTINUED: indomethacin (INDOCIN) 25 MG capsule   2. Calcification of joint M25.80 indomethacin (INDOCIN) 50 MG capsule     DISCONTINUED: indomethacin (INDOCIN) 25 MG capsule   3. Essential hypertension I10 oxyCODONE (ROXICODONE) 5 MG IR tablet     indomethacin (INDOCIN) 50 MG capsule     Patient is 6 weeks status post left total hip arthroplasty. Patient with more pain than expected at this point in time. Patient has been doing more strenuous activity than recommended.    Plan:   Physical therapy is ordered for patient to stretch and strengthen left hip. Only a few visits would be needed as patient is very motivated at home.  We'll prescribe Indocin for the next 4 weeks to address calcifications  We did prescribe additional oxycodone for intermittent use.  Patient is cautioned not to do too much strenuous activity such as cutting wood  Return to work: Not a concern at this time  Return to clinic 6 weeks    Sherry Moreno PA-C   2/21/2017  8:55 AM      I attest I have seen and evaluated the patient.  I agree with above impression and plan.    Florentino Lawrence MD

## 2017-02-21 NOTE — NURSING NOTE
Chief Complaint   Patient presents with     RECHECK     Left total hip Arthroplasty.  DOS: 1/9/17 (6 weeks postop)     Surgical Followup     PREOPERATIVE DIAGNOSIS:  Left hip degenerative arthritis.  POSTOPERATIVE DIAGNOSIS:  Left hip degenerative arthritis.  PROCEDURE:  Left total hip arthroplasty using Stephan Tritanium cup 58 mm fixed with a single 6.5 mm screw; Accolade 2 Size 5 stem 130 degree angle neck; 36 mm Biolox delta head and a 0-degree polyethylene liner.  Posterior approach was utilized.       Initial Temp 98.3  F (36.8  C) (Temporal)  Ht 1.829 m (6')  Wt 101.2 kg (223 lb)  BMI 30.24 kg/m2 Estimated body mass index is 30.24 kg/(m^2) as calculated from the following:    Height as of this encounter: 1.829 m (6').    Weight as of this encounter: 101.2 kg (223 lb).  Medication Reconciliation: complete   VADIM Grant

## 2017-03-14 DIAGNOSIS — I10 ESSENTIAL HYPERTENSION: ICD-10-CM

## 2017-03-14 RX ORDER — OXYCODONE HYDROCHLORIDE 5 MG/1
5-10 TABLET ORAL EVERY 6 HOURS PRN
Qty: 60 TABLET | Refills: 0 | Status: SHIPPED | OUTPATIENT
Start: 2017-03-14 | End: 2017-08-01

## 2017-05-22 ENCOUNTER — TELEPHONE (OUTPATIENT)
Dept: FAMILY MEDICINE | Facility: CLINIC | Age: 57
End: 2017-05-22

## 2017-05-22 NOTE — LETTER
[address] [address]  Phone: [phone]  Fax: [phone]          May 22, 2017    Ethan Espinosa  [id number] [address] [place] [id number]          Dear Ethan,    Our records show that you are due for a Colonoscopy. Please call the phone number above to get scheduled. Thanks in advance.          Sincerely,      Gregory G. Schoen, M.D./[facility]

## 2017-06-09 ENCOUNTER — OFFICE VISIT (OUTPATIENT)
Dept: FAMILY MEDICINE | Facility: CLINIC | Age: 57
End: 2017-06-09
Payer: COMMERCIAL

## 2017-06-09 VITALS
BODY MASS INDEX: 31.15 KG/M2 | HEART RATE: 68 BPM | OXYGEN SATURATION: 98 % | DIASTOLIC BLOOD PRESSURE: 92 MMHG | HEIGHT: 72 IN | SYSTOLIC BLOOD PRESSURE: 184 MMHG | TEMPERATURE: 97.3 F | WEIGHT: 230 LBS

## 2017-06-09 DIAGNOSIS — R79.89 LOW TESTOSTERONE: Primary | ICD-10-CM

## 2017-06-09 DIAGNOSIS — Z13.6 CARDIOVASCULAR SCREENING; LDL GOAL LESS THAN 160: ICD-10-CM

## 2017-06-09 DIAGNOSIS — Z96.642 STATUS POST TOTAL REPLACEMENT OF LEFT HIP: ICD-10-CM

## 2017-06-09 DIAGNOSIS — I10 ESSENTIAL HYPERTENSION: ICD-10-CM

## 2017-06-09 LAB
ANION GAP SERPL CALCULATED.3IONS-SCNC: 8 MMOL/L (ref 3–14)
BUN SERPL-MCNC: 21 MG/DL (ref 7–30)
CALCIUM SERPL-MCNC: 9.4 MG/DL (ref 8.5–10.1)
CHLORIDE SERPL-SCNC: 108 MMOL/L (ref 94–109)
CHOLEST SERPL-MCNC: 218 MG/DL
CO2 SERPL-SCNC: 28 MMOL/L (ref 20–32)
CREAT SERPL-MCNC: 0.9 MG/DL (ref 0.66–1.25)
GFR SERPL CREATININE-BSD FRML MDRD: 87 ML/MIN/1.7M2
GLUCOSE SERPL-MCNC: 84 MG/DL (ref 70–99)
HDLC SERPL-MCNC: 70 MG/DL
LDLC SERPL CALC-MCNC: 112 MG/DL
NONHDLC SERPL-MCNC: 148 MG/DL
POTASSIUM SERPL-SCNC: 4.2 MMOL/L (ref 3.4–5.3)
SODIUM SERPL-SCNC: 144 MMOL/L (ref 133–144)
TRIGL SERPL-MCNC: 179 MG/DL

## 2017-06-09 PROCEDURE — 36415 COLL VENOUS BLD VENIPUNCTURE: CPT | Performed by: FAMILY MEDICINE

## 2017-06-09 PROCEDURE — 80048 BASIC METABOLIC PNL TOTAL CA: CPT | Performed by: FAMILY MEDICINE

## 2017-06-09 PROCEDURE — 84270 ASSAY OF SEX HORMONE GLOBUL: CPT | Performed by: FAMILY MEDICINE

## 2017-06-09 PROCEDURE — 99213 OFFICE O/P EST LOW 20 MIN: CPT | Performed by: FAMILY MEDICINE

## 2017-06-09 PROCEDURE — 80061 LIPID PANEL: CPT | Performed by: FAMILY MEDICINE

## 2017-06-09 PROCEDURE — 84403 ASSAY OF TOTAL TESTOSTERONE: CPT | Performed by: FAMILY MEDICINE

## 2017-06-09 RX ORDER — HYDROMORPHONE HYDROCHLORIDE 2 MG/1
2-4 TABLET ORAL EVERY 4 HOURS PRN
Qty: 60 TABLET | Refills: 0 | Status: SHIPPED | OUTPATIENT
Start: 2017-06-09 | End: 2017-08-01

## 2017-06-09 RX ORDER — LISINOPRIL 10 MG/1
10 TABLET ORAL DAILY
Qty: 90 TABLET | Refills: 3 | Status: SHIPPED | OUTPATIENT
Start: 2017-06-09 | End: 2018-08-04

## 2017-06-09 ASSESSMENT — PAIN SCALES - GENERAL: PAINLEVEL: MODERATE PAIN (4)

## 2017-06-09 NOTE — MR AVS SNAPSHOT
"              After Visit Summary   2017    Ethan Espinosa    MRN: [id number]           Patient Information     Date Of Birth          1960        Visit Information        Provider Department      2017 2:30 PM Schoen, Gregory G, MD [facility]        Today's Diagnoses     Low testosterone    -  1    Status post total replacement of left hip        Essential hypertension        CARDIOVASCULAR SCREENING; LDL GOAL LESS THAN 160           Follow-ups after your visit        Who to contact     If you have questions or need follow up information about today's clinic visit or your schedule please contact [facility] directly at [phone].  Normal or non-critical lab and imaging results will be communicated to you by MyChart, letter or phone within 4 business days after the clinic has received the results. If you do not hear from us within 7 days, please contact the clinic through [employer] or phone. If you have a critical or abnormal lab result, we will notify you by phone as soon as possible.  Submit refill requests through [employer] or call your pharmacy and they will forward the refill request to us. Please allow 3 business days for your refill to be completed.          Additional Information About Your Visit        MyChart Information     [employer] lets you send messages to your doctor, view your test results, renew your prescriptions, schedule appointments and more. To sign up, go to [email or web address] . Click on \"Log in\" on the left side of the screen, which will take you to the Welcome page. Then click on \"Sign up Now\" on the right side of the page.     You will be asked to enter the access code listed below, as well as some personal information. Please follow the directions to create your username and password.     Your access code is: [email or web address]  Expires: 2017  3:14 PM     Your access code will  in 90 days. If you need help or a new code, please call your " [facility] or [phone].        Care EveryWhere ID     This is your Care EveryWhere ID. This could be used by other organizations to access your [place] medical records  [id number]        Your Vitals Were     Pulse Temperature Height Pulse Oximetry BMI (Body Mass Index)       68 97.3  F (36.3  C) (Temporal) 6' (1.829 m) 98% 31.19 kg/m2        Blood Pressure from Last 3 Encounters:   06/09/17 (!) 184/92   01/27/17 (!) 158/94   01/10/17 139/57    Weight from Last 3 Encounters:   06/09/17 230 lb (104.3 kg)   02/21/17 223 lb (101.2 kg)   01/27/17 225 lb (102.1 kg)              We Performed the Following     **Testosterone Free and Total FUTURE anytime     Basic metabolic panel     Lipid Profile with reflex to direct LDL          Today's Medication Changes          These changes are accurate as of: 6/9/17  3:14 PM.  If you have any questions, ask your nurse or doctor.               Start taking these medicines.        Dose/Directions    HYDROmorphone 2 MG tablet   Commonly known as:  DILAUDID   Used for:  Status post total replacement of left hip   Started by:  Schoen, Gregory G, MD        Dose:  2-4 mg   Take 1-2 tablets (2-4 mg) by mouth every 4 hours as needed for pain   Quantity:  60 tablet   Refills:  0            Where to get your medicines      These medications were sent to [address] - [address]  [address], [place] [address]     Phone:  [phone]     lisinopril 10 MG tablet         Some of these will need a paper prescription and others can be bought over the counter.  Ask your nurse if you have questions.     Bring a paper prescription for each of these medications     HYDROmorphone 2 MG tablet                Primary Care Provider Office Phone # Fax #    Gregory G Schoen, [phone]       [facility] [address] [place] [address]        Thank you!     Thank you for choosing [facility]  for your care. Our goal  is always to provide you with excellent care. Hearing back from our patients is one way we can continue to improve our services. Please take a few minutes to complete the written survey that you may receive in the mail after your visit with us. Thank you!             Your Updated Medication List - Protect others around you: Learn how to safely use, store and throw away your medicines at www.disposemymeds.org.          This list is accurate as of: 6/9/17  3:14 PM.  Always use your most recent med list.                   Brand Name Dispense Instructions for use    ANDROGEL 1% PUMP 12.5 MG/ACT (1%) gel   Generic drug:  testosterone     1 Month    Apply 1gm every morning       HYDROmorphone 2 MG tablet    DILAUDID    60 tablet    Take 1-2 tablets (2-4 mg) by mouth every 4 hours as needed for pain       indomethacin 50 MG capsule    INDOCIN    60 capsule    Take 1 capsule (50 mg) by mouth 2 times daily (with meals)       lisinopril 10 MG tablet    PRINIVIL/ZESTRIL    90 tablet    Take 1 tablet (10 mg) by mouth daily       * order for DME      1 Device Respironics Auto CPAP @@ 7-12 cm via nasal mask per order of Dr. Headley.       * order for DME     1 unit marking on an U-100 insulin syringe    Equipment being ordered: TENS       oxyCODONE 5 MG IR tablet    ROXICODONE    60 tablet    Take 1-2 tablets (5-10 mg) by mouth every 6 hours as needed for pain maximum 6 tablet(s) per day       sildenafil 100 MG cap/tab    VIAGRA    4 tablet    Take 1 tablet (100 mg) by mouth daily as needed for erectile dysfunction Take 30 min to 4 hours before intercourse.  Never use with nitroglycerin, terazosin or doxazosin.       triamcinolone 0.1 % cream    KENALOG    30 g    Apply sparingly to affected area three times daily for 14 days or until rash cleared       VITAMIN D (CHOLECALCIFEROL) PO      Take 1,000 Units by mouth daily Reported on 2/21/2017       * Notice:  This list has 2 medication(s) that are the same as other medications  prescribed for you. Read the directions carefully, and ask your doctor or other care provider to review them with you.

## 2017-06-09 NOTE — PROGRESS NOTES
SUBJECTIVE:                                                    Ethan Espinosa is a 57 year old male who presents to clinic today for the following health issues:    Hypertension Follow-up      Outpatient blood pressures are not being checked recently due to health issues with his wife.  He was taking  lisinopril 10 mg in the evenings and pressures were consistently in the 130s and felt better.  He has been off a few weeks now.      Low Salt Diet: no added salt       Amount of exercise or physical activity: 6-7 days/week for an average of 30-45 minutes    Problems taking medications regularly: trouble taking daily    Medication side effects: none    Diet: regular (no restrictions)    He is also having some pain in his surgical knee as he is doing more activity and from time to time is having a lot of pain.  He did get good relief with dilaudid at the time of his surgery and was wondering if he could have that on hand again. The last refill of this was back in January of this medication so has not been using this medication excessively.      Denies chest pains, palpitations, signs of angina, dizziness, headaches.    No resp symptoms and no cough side effect from lisinopril.      OBJECTIVE:  BP (!) 184/92 (BP Location: Left arm, Patient Position: Chair, Cuff Size: Adult Large)  Pulse 68  Temp 97.3  F (36.3  C) (Temporal)  Ht 6' (1.829 m)  Wt 230 lb (104.3 kg)  SpO2 98%  BMI 31.19 kg/m2  Alert and oriented, in no acute distress.  Lungs are clear.  Heart is regular, no murmur.        ASSESSMENT:     Status post total replacement of left hip  Essential hypertension  Low testosterone  CARDIOVASCULAR SCREENING; LDL GOAL LESS THAN 160     PLAN:  Will refill his lisinopril and he will resume taking this 10mg in the evening. He will monitor pressures and call if not between 120 and 140 systolic.  I did give a refill of dilaudid for pain management and will monitor use. This appears to be low risk for developing a  longterm pain issue.  He will call if pain is not adequately controlled and may need some PT.  Labs ordered to follow up lipids, basic profile and recheck his low testosterone. Will call with results.     Electronically signed by Greg Schoen, MD

## 2017-06-13 LAB
SHBG SERPL-SCNC: 15 NMOL/L (ref 11–80)
TESTOST FREE SERPL-MCNC: 8.01 NG/DL (ref 4.7–24.4)
TESTOST SERPL-MCNC: 282 NG/DL (ref 240–950)

## 2017-07-27 ENCOUNTER — TELEPHONE (OUTPATIENT)
Dept: FAMILY MEDICINE | Facility: CLINIC | Age: 57
End: 2017-07-27

## 2017-07-27 NOTE — TELEPHONE ENCOUNTER
Panel Management Review      Patient has the following on his problem list:     Hypertension   Last three blood pressure readings:  BP Readings from Last 3 Encounters:   06/09/17 (!) 184/92   01/27/17 (!) 158/94   01/10/17 139/57     Blood pressure: FAILED    HTN Guidelines:  Age 18-59 BP range:  Less than 140/90  Age 60-85 with Diabetes:  Less than 140/90  Age 60-85 without Diabetes:  less than 150/90        Composite cancer screening  Chart review shows that this patient is due/due soon for the following Colonoscopy  Summary:    Patient is due/failing the following:   BP CHECK and COLONOSCOPY    Action needed:   Patient needs nurse only appointment.    Type of outreach:    Phone, spoke to patient.  Appt for nurse only is scheduled for Monday    Questions for provider review:    None                                                                                                                                    Elle Mendez MA 7/27/2017         Chart routed to NA .

## 2017-08-01 ENCOUNTER — RADIANT APPOINTMENT (OUTPATIENT)
Dept: GENERAL RADIOLOGY | Facility: CLINIC | Age: 57
End: 2017-08-01
Attending: ORTHOPAEDIC SURGERY
Payer: COMMERCIAL

## 2017-08-01 ENCOUNTER — OFFICE VISIT (OUTPATIENT)
Dept: ORTHOPEDICS | Facility: CLINIC | Age: 57
End: 2017-08-01
Payer: COMMERCIAL

## 2017-08-01 ENCOUNTER — HOSPITAL ENCOUNTER (OUTPATIENT)
Dept: MRI IMAGING | Facility: CLINIC | Age: 57
Discharge: HOME | End: 2017-08-01
Attending: ORTHOPAEDIC SURGERY | Admitting: ORTHOPAEDIC SURGERY
Payer: COMMERCIAL

## 2017-08-01 VITALS — WEIGHT: 235 LBS | HEIGHT: 72 IN | TEMPERATURE: 98.4 F | BODY MASS INDEX: 31.83 KG/M2

## 2017-08-01 DIAGNOSIS — M25.511 RIGHT SHOULDER PAIN, UNSPECIFIED CHRONICITY: ICD-10-CM

## 2017-08-01 DIAGNOSIS — M25.511 RIGHT SHOULDER PAIN, UNSPECIFIED CHRONICITY: Primary | ICD-10-CM

## 2017-08-01 DIAGNOSIS — Z96.642 STATUS POST TOTAL REPLACEMENT OF LEFT HIP: ICD-10-CM

## 2017-08-01 DIAGNOSIS — S46.011A ROTATOR CUFF STRAIN, RIGHT, INITIAL ENCOUNTER: ICD-10-CM

## 2017-08-01 PROCEDURE — 73030 X-RAY EXAM OF SHOULDER: CPT | Mod: TC

## 2017-08-01 PROCEDURE — 73221 MRI JOINT UPR EXTREM W/O DYE: CPT | Mod: RT

## 2017-08-01 PROCEDURE — 99214 OFFICE O/P EST MOD 30 MIN: CPT | Performed by: ORTHOPAEDIC SURGERY

## 2017-08-01 RX ORDER — HYDROMORPHONE HYDROCHLORIDE 2 MG/1
2-4 TABLET ORAL EVERY 4 HOURS PRN
Qty: 20 TABLET | Refills: 0 | Status: SHIPPED | OUTPATIENT
Start: 2017-08-01 | End: 2017-12-07

## 2017-08-01 ASSESSMENT — PAIN SCALES - GENERAL: PAINLEVEL: MODERATE PAIN (4)

## 2017-08-01 NOTE — NURSING NOTE
Chief Complaint   Patient presents with     RECHECK     Right shoulder pain.  Onset: 7/24/17 (s/p 8 days) Slipped while changing a tire.       Initial Temp 98.4  F (36.9  C) (Temporal)  Ht 1.829 m (6')  Wt 106.6 kg (235 lb)  BMI 31.87 kg/m2 Estimated body mass index is 31.87 kg/(m^2) as calculated from the following:    Height as of this encounter: 1.829 m (6').    Weight as of this encounter: 106.6 kg (235 lb).  Medication Reconciliation: complete   VADIM Grant

## 2017-08-01 NOTE — PROGRESS NOTES
Office Visit-Follow up  HISTORY OF PRESENT ILLNESS:    Ethan Espinosa is a 57 year old male who is seen in follow up for   Chief Complaint   Patient presents with     RECHECK     Right shoulder pain.  Onset: 7/24/17 (s/p 8 days) Slipped while changing a tire.       Patient presents with:  RECHECK: Right shoulder pain.  Onset: 7/24/17 (s/p 8 days) Slipped while changing a tire.      Patient is returning with a new problem.  Present symptoms: Within a week he injured his right shoulder. He wished to continue tire. His right hand was on the roof of the car while he was standing on a tire wrench attempting to loosen a bolt. He lost his balance and strained his right shoulder as he was beginning to fall over.  He immediately felt pain and had an immediate loss of his ability to reach out and overhead.  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 His shoulder was recently gave up this activity.  Treatments tried to this point: No formal treatment at this time.    REVIEW OF SYSTEMS  General: negative for, night sweats, dizziness, fatigue  Resp: No shortness of breath and no cough  CV: negative for chest pain, syncope or near-syncope  GI: negative for nausea, vomiting and diarrhea  : negative for dysuria and hematuria  Musculoskeletal: as above  Neurologic: negative for syncope   Hematologic: negative for bleeding disorder    Physical Exam:  Vitals: Temp 98.4  F (36.9  C) (Temporal)  Ht 1.829 m (6')  Wt 106.6 kg (235 lb)  BMI 31.87 kg/m2  BMI= Body mass index is 31.87 kg/(m^2).  Constitutional: healthy, alert and no acute distress   Psychiatric: mentation appears normal and affect normal/bright  NEURO: no focal deficits  SKIN: no excoriation or erythema. No signs of infection.    GAIT: non-antalgic    JOINT/EXTREMITIES:     He does have a hint of tenderness over the right a.c. Joint.  There is no deformity.  His passive range of motion is  allowable to at least 170  of forward flexion. Side abduction is at least 130 . Arm at side external rotation is almost 90 . Behind back internal rotation is only the low lumbar region.    His active forward elevation is around 160 . Active side abduction is only about 70  limited by pain.    Rotator cuff strength testing shows supraspinatus testing is painful and weak.     IMAGING INTERPRETATION: Three-view x-rays of the shoulder were obtained. He has a fairly significant lateral slope to the acromion on the AP view. The interval between the humeral head and the acromion is narrowed. He has at least a type II acromion. Glenohumeral joint otherwise is reasonably well maintained.           Impression:      ICD-10-CM    1. Right shoulder pain, unspecified chronicity M25.511 XR Shoulder Right G/E 3 Views     MR Shoulder Right w/o Contrast   2. Status post total replacement of left hip Z96.642 HYDROmorphone (DILAUDID) 2 MG tablet   3. Rotator cuff strain, right, initial encounter S46.011A        Patient had a relatively minor shoulder incident with a fairly significant result change in function. He also has radiographic changes that may come a likely candidate for impingement, and perhaps subsequent rotator cuff tear.       Plan:   The above was reviewed with Ethan and he understands.     I think we should obtain an MRI to verify cuff integrity.    In the meantime he was shown how to do both passive and active range of motion exercises.    Return to clinic to discuss MRI results    Florentino Lawrence MD

## 2017-08-01 NOTE — MR AVS SNAPSHOT
"              After Visit Summary   8/1/2017    Ethan Espinosa    MRN: [id number]           Patient Information     Date Of Birth          1960        Visit Information        Provider Department      8/1/2017 9:30 AM Florentino Lawrence MD [facility]        Today's Diagnoses     Right shoulder pain, unspecified chronicity    -  1       Follow-ups after your visit        Future tests that were ordered for you today     Open Future Orders        Priority Expected Expires Ordered    XR Shoulder Right G/E 3 Views Routine 8/1/2017 8/1/2018 8/1/2017            Who to contact     If you have questions or need follow up information about today's clinic visit or your schedule please contact [facility] directly at [phone].  Normal or non-critical lab and imaging results will be communicated to you by MyChart, letter or phone within 4 business days after the clinic has received the results. If you do not hear from us within 7 days, please contact the clinic through [employer] or phone. If you have a critical or abnormal lab result, we will notify you by phone as soon as possible.  Submit refill requests through [employer] or call your pharmacy and they will forward the refill request to us. Please allow 3 business days for your refill to be completed.          Additional Information About Your Visit        [employer] Information     [employer] lets you send messages to your doctor, view your test results, renew your prescriptions, schedule appointments and more. To sign up, go to [email or web address] . Click on \"Log in\" on the left side of the screen, which will take you to the Welcome page. Then click on \"Sign up Now\" on the right side of the page.     You will be asked to enter the access code listed below, as well as some personal information. Please follow the directions to create your username and password.     Your access code is: [email or web address]  Expires: 9/7/2017  3:14 PM     Your " access code will  in 90 days. If you need help or a new code, please call your [facility] clinic or [phone].        Care EveryWhere ID     This is your Care EveryWhere ID. This could be used by other organizations to access your [place] medical records  [id number]        Your Vitals Were     Temperature Height BMI (Body Mass Index)             98.4  F (36.9  C) (Temporal) 1.829 m (6') 31.87 kg/m2          Blood Pressure from Last 3 Encounters:   17 (!) 184/92   17 (!) 158/94   01/10/17 139/57    Weight from Last 3 Encounters:   17 106.6 kg (235 lb)   17 104.3 kg (230 lb)   17 101.2 kg (223 lb)               Primary Care Provider Office Phone # Fax #    Gregory G Schoen, [phone]       [facility] [address] [place] [address]        Equal Access to Services     ASHANTI [facility] : Hadii aad ku hadasho Soomaali, waaxda luqadaha, qaybta kaalmada adeegyada, waxay jacques haymeme shaw . So [facility] [phone].    ATENCIÓN: Si habla español, tiene a bob disposición servicios gratuitos de asistencia lingüística. Llame al [phone].    We comply with applicable federal civil rights laws and [place] laws. We do not discriminate on the basis of race, color, national origin, age, disability sex, sexual orientation or gender identity.            Thank you!     Thank you for choosing [facility]  for your care. Our goal is always to provide you with excellent care. Hearing back from our patients is one way we can continue to improve our services. Please take a few minutes to complete the written survey that you may receive in the mail after your visit with us. Thank you!             Your Updated Medication List - Protect others around you: Learn how to safely use, store and throw away your medicines at www.disposemymeds.org.          This list is accurate as of: 17  9:30 AM.  Always use your most recent med  list.                   Brand Name Dispense Instructions for use Diagnosis    ANDROGEL 1% PUMP 12.5 MG/ACT (1%) gel   Generic drug:  testosterone     1 Month    Apply 1gm every morning    Low testosterone       HYDROmorphone 2 MG tablet    DILAUDID    60 tablet    Take 1-2 tablets (2-4 mg) by mouth every 4 hours as needed for pain    Status post total replacement of left hip       lisinopril 10 MG tablet    PRINIVIL/ZESTRIL    90 tablet    Take 1 tablet (10 mg) by mouth daily    Essential hypertension       * order for DME      1 Device Respironics Auto CPAP @@ 7-12 cm via nasal mask per order of Dr. Headley.        * order for DME     1 unit marking on an U-100 insulin syringe    Equipment being ordered: TENS    Status post medial meniscus repair       sildenafil 100 MG cap/tab    VIAGRA    4 tablet    Take 1 tablet (100 mg) by mouth daily as needed for erectile dysfunction Take 30 min to 4 hours before intercourse.  Never use with nitroglycerin, terazosin or doxazosin.    Other male erectile dysfunction       triamcinolone 0.1 % cream    KENALOG    30 g    Apply sparingly to affected area three times daily for 14 days or until rash cleared    Status post total replacement of left hip, Rash, skin       VITAMIN D (CHOLECALCIFEROL) PO      Take 1,000 Units by mouth daily Reported on 2/21/2017        * Notice:  This list has 2 medication(s) that are the same as other medications prescribed for you. Read the directions carefully, and ask your doctor or other care provider to review them with you.

## 2017-08-03 ENCOUNTER — OFFICE VISIT (OUTPATIENT)
Dept: ORTHOPEDICS | Facility: CLINIC | Age: 57
End: 2017-08-03
Payer: COMMERCIAL

## 2017-08-03 VITALS — HEIGHT: 72 IN | WEIGHT: 235 LBS | BODY MASS INDEX: 31.83 KG/M2 | TEMPERATURE: 98.1 F

## 2017-08-03 DIAGNOSIS — M75.122 COMPLETE TEAR OF LEFT ROTATOR CUFF: Primary | ICD-10-CM

## 2017-08-03 PROCEDURE — 99213 OFFICE O/P EST LOW 20 MIN: CPT | Performed by: ORTHOPAEDIC SURGERY

## 2017-08-03 ASSESSMENT — PAIN SCALES - GENERAL: PAINLEVEL: MILD PAIN (2)

## 2017-08-03 NOTE — PROGRESS NOTES
Office Visit-Follow up  HISTORY OF PRESENT ILLNESS:    Ethan Espinosa is a 57 year old male who is seen in follow up for   Chief Complaint   Patient presents with     RECHECK     Right shoulder pain.  Onset: 7/24/17 (s/p 9 days) Slipped while changing a tire.       Patient presents with:  RECHECK: Right shoulder pain.  Onset: 7/24/17 (s/p 9 days) Slipped while changing a tire.      The patient reminds us today that he has had long-term problems with the shoulder.  He claims that he has been evaluated before for this.  He has also been able to obtain a crossbow permit for hunting.  Present symptoms: Unchanged from previous visit.  Treatments tried to this point: On previous occasions he has obviously been treated.    REVIEW OF SYSTEMS  General: negative for, night sweats, dizziness, fatigue  Resp: No shortness of breath and no cough  CV: negative for chest pain, syncope or near-syncope  GI: negative for nausea, vomiting and diarrhea  : negative for dysuria and hematuria  Musculoskeletal: as above  Neurologic: negative for syncope   Hematologic: negative for bleeding disorder    Physical Exam:  Vitals: Temp 98.1  F (36.7  C) (Temporal)  Ht 1.829 m (6')  Wt 106.6 kg (235 lb)  BMI 31.87 kg/m2  BMI= Body mass index is 31.87 kg/(m^2).  Constitutional: healthy, alert and no acute distress   Psychiatric: mentation appears normal and affect normal/bright  NEURO: no focal deficits  SKIN: no excoriation or erythema. No signs of infection.    GAIT: He limps a little because of a knee problem.    JOINT/EXTREMITIES:     We specifically test rotator cuff function again today.  He has pain and weakness that is relatively mild with supraspinatus testing.  He has weakness with abdominal liftoff testing.  His speed's testing is performed pretty well but he is weak.    The contour of his biceps in my opinion appears to be symmetrical.    IMAGING INTERPRETATION: MRI results were reviewed and the scan was reviewed. He has a tear  of the anterior fibers of the supraspinatus. My interpretation is that he has a tear of the subscapularis at least the upper half. He has some atrophy of the subscapularis muscle. I concur with the changes seen on the biceps. He probably has had a previous biceps tendon tear that is scarred effectively.       Impression:      ICD-10-CM    1. Complete tear of left rotator cuff M75.122        This appears to be complex in nature.    Plan:   The above was reviewed with Ethan.     Given his age and activity level I think he should have his shoulder addressed surgically.  He was explained that I would start off arthroscopically to assess the degree of damage.  I however feel that he will probably benefit from open repair of the subscapularis.  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 I would want to make sure that this was captured and aggressively sutured down.  I did try to described for him the after surgery recovery and his limitations that will occur. For this reason he would like to delay surgical approach to later this year.    Therefore he will probably be calling our office to schedule shoulder surgery.    He should be seen one week prior to the surgery simply to reexamine.      Please schedule for surgery, pre op H&P, and post ops.      Patient Name:  Ethan Espinosa ([id number]).  :  1960  Gender:  male  Patient Type:  Same Day Surgery  Surgeon:  Florentino Lawrence MD  Physician requests assist from:  PA    Procedures:    Arthroscopy of the right shoulder with probable open rotator cuff repair.   Approach:  NA  Diagnosis:  Complete tear of left rotator cuff  C-arm:  No  Mini C-arm:   No  Pathology Scheduled:  No  Special instruments/supplies:  Ar[employer]  Vendor Rep:  [employer]  Anesthesia:  General  Block:  Yes - Given by  CRNA  Block type: Shoulder  Time needed:  120 minutes    [facility] Discussed:  Not  Applicable    Post op 1:                  Return to clinic 1 week prior to surgery    Florentino Lawrence MD

## 2017-08-03 NOTE — NURSING NOTE
Chief Complaint   Patient presents with     RECHECK     Right shoulder pain.  Onset: 7/24/17 (s/p 9 days) Slipped while changing a tire.       Initial Temp 98.1  F (36.7  C) (Temporal)  Ht 1.829 m (6')  Wt 106.6 kg (235 lb)  BMI 31.87 kg/m2 Estimated body mass index is 31.87 kg/(m^2) as calculated from the following:    Height as of this encounter: 1.829 m (6').    Weight as of this encounter: 106.6 kg (235 lb).  Medication Reconciliation: complete   VADIM Grant

## 2017-08-03 NOTE — MR AVS SNAPSHOT
"              After Visit Summary   8/3/2017    Ethan Espinosa    MRN: [id number]           Patient Information     Date Of Birth          1960        Visit Information        Provider Department      8/3/2017 9:00 AM Florentino Lawrence MD [facility]         Follow-ups after your visit        Who to contact     If you have questions or need follow up information about today's clinic visit or your schedule please contact [facility] directly at [phone].  Normal or non-critical lab and imaging results will be communicated to you by MyChart, letter or phone within 4 business days after the clinic has received the results. If you do not hear from us within 7 days, please contact the clinic through [employer] or phone. If you have a critical or abnormal lab result, we will notify you by phone as soon as possible.  Submit refill requests through [email or web address] or call your pharmacy and they will forward the refill request to us. Please allow 3 business days for your refill to be completed.          Additional Information About Your Visit        [employer] Information     [email or web address] lets you send messages to your doctor, view your test results, renew your prescriptions, schedule appointments and more. To sign up, go to [email or web address] . Click on \"Log in\" on the left side of the screen, which will take you to the Welcome page. Then click on \"Sign up Now\" on the right side of the page.     You will be asked to enter the access code listed below, as well as some personal information. Please follow the directions to create your username and password.     Your access code is: [email or web address]  Expires: 2017  3:14 PM     Your access code will  in 90 days. If you need help or a new code, please call your [facility] or [phone].        Care EveryWhere ID     This is your Care EveryWhere ID. This could be used by other organizations to access your [facility] " records  [id number]        Your Vitals Were     Temperature Height BMI (Body Mass Index)             98.1  F (36.7  C) (Temporal) 1.829 m (6') 31.87 kg/m2          Blood Pressure from Last 3 Encounters:   06/09/17 (!) 184/92   01/27/17 (!) 158/94   01/10/17 139/57    Weight from Last 3 Encounters:   08/03/17 106.6 kg (235 lb)   08/01/17 106.6 kg (235 lb)   06/09/17 104.3 kg (230 lb)              Today, you had the following     No orders found for display       Primary Care Provider Office Phone # Fax #    Gregory G Schoen, [phone]       [facility] [address] [place] [address]        Equal Access to Services     [facility]: Hadii tj carvajal hadasho Soomaali, waaxda luqadaha, qaybta kaalmada adeegyada, meño shaw . So [facility] [phone].    ATENCIÓN: Si habla español, tiene a bob disposición servicios gratuitos de asistencia lingüística. Mark al [phone].    We comply with applicable federal civil rights laws and [place] laws. We do not discriminate on the basis of race, color, national origin, age, disability sex, sexual orientation or gender identity.            Thank you!     Thank you for choosing [facility]  for your care. Our goal is always to provide you with excellent care. Hearing back from our patients is one way we can continue to improve our services. Please take a few minutes to complete the written survey that you may receive in the mail after your visit with us. Thank you!             Your Updated Medication List - Protect others around you: Learn how to safely use, store and throw away your medicines at www.disposemymeds.org.          This list is accurate as of: 8/3/17  9:13 AM.  Always use your most recent med list.                   Brand Name Dispense Instructions for use Diagnosis    ANDROGEL 1% PUMP 12.5 MG/ACT (1%) gel   Generic drug:  testosterone     1 Month    Apply 1gm every morning    Low  testosterone       HYDROmorphone 2 MG tablet    DILAUDID    20 tablet    Take 1-2 tablets (2-4 mg) by mouth every 4 hours as needed for pain    Status post total replacement of left hip       lisinopril 10 MG tablet    PRINIVIL/ZESTRIL    90 tablet    Take 1 tablet (10 mg) by mouth daily    Essential hypertension       * order for DME      1 Device Respironics Auto CPAP @@ 7-12 cm via nasal mask per order of Dr. Headley.        * order for DME     1 unit marking on an U-100 insulin syringe    Equipment being ordered: TENS    Status post medial meniscus repair       sildenafil 100 MG cap/tab    VIAGRA    4 tablet    Take 1 tablet (100 mg) by mouth daily as needed for erectile dysfunction Take 30 min to 4 hours before intercourse.  Never use with nitroglycerin, terazosin or doxazosin.    Other male erectile dysfunction       triamcinolone 0.1 % cream    KENALOG    30 g    Apply sparingly to affected area three times daily for 14 days or until rash cleared    Status post total replacement of left hip, Rash, skin       VITAMIN D (CHOLECALCIFEROL) PO      Take 1,000 Units by mouth daily Reported on 2/21/2017        * Notice:  This list has 2 medication(s) that are the same as other medications prescribed for you. Read the directions carefully, and ask your doctor or other care provider to review them with you.

## 2017-08-03 NOTE — LETTER
8/3/2017         RE: Ethan Espinosa  [address] [facility] [address]        Dear Colleague,    Thank you for referring your patient, Ethan Espinosa, to the [facility]. Please see a copy of my visit note below.    Office Visit-Follow up  HISTORY OF PRESENT ILLNESS:    Ethan Espinosa is a 57 year old male who is seen in follow up for   Chief Complaint   Patient presents with     RECHECK     Right shoulder pain.  Onset: 7/24/17 (s/p 9 days) Slipped while changing a tire.       Patient presents with:  RECHECK: Right shoulder pain.  Onset: 7/24/17 (s/p 9 days) Slipped while changing a tire.      The patient reminds us today that he has had long-term problems with the shoulder.  He claims that he has been evaluated before for this.  He has also been able to obtain a crossbow permit for hunting.  Present symptoms: Unchanged from previous visit.  Treatments tried to this point: On previous occasions he has obviously been treated.    REVIEW OF SYSTEMS  General: negative for, night sweats, dizziness, fatigue  Resp: No shortness of breath and no cough  CV: negative for chest pain, syncope or near-syncope  GI: negative for nausea, vomiting and diarrhea  : negative for dysuria and hematuria  Musculoskeletal: as above  Neurologic: negative for syncope   Hematologic: negative for bleeding disorder    Physical Exam:  Vitals: Temp 98.1  F (36.7  C) (Temporal)  Ht 1.829 m (6')  Wt 106.6 kg (235 lb)  BMI 31.87 kg/m2  BMI= Body mass index is 31.87 kg/(m^2).  Constitutional: healthy, alert and no acute distress   Psychiatric: mentation appears normal and affect normal/bright  NEURO: no focal deficits  SKIN: no excoriation or erythema. No signs of infection.    GAIT: He limps a little because of a knee problem.    JOINT/EXTREMITIES:     We specifically test rotator cuff function again today.  He has pain and weakness that is relatively mild with supraspinatus testing.  He has weakness with  abdominal liftoff testing.  His speed's testing is performed pretty well but he is weak.    The contour of his biceps in my opinion appears to be symmetrical.    IMAGING INTERPRETATION: MRI results were reviewed and the scan was reviewed. He has a tear of the anterior fibers of the supraspinatus. My interpretation is that he has a tear of the subscapularis at least the upper half. He has some atrophy of the subscapularis muscle. I concur with the changes seen on the biceps. He probably has had a previous biceps tendon tear that is scarred effectively.       Impression:      ICD-10-CM    1. Complete tear of left rotator cuff M75.122        This appears to be complex in nature.    Plan:   The above was reviewed with Ethan.     Given his age and activity level I think he should have his shoulder addressed surgically.  He was explained that I would start off arthroscopically to assess the degree of damage.  I however feel that he will probably benefit from open repair of the subscapularis.  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 I would want to make sure that this was captured and aggressively sutured down.  I did try to described for him the after surgery recovery and his limitations that will occur. For this reason he would like to delay surgical approach to later this year.    Therefore he will probably be calling our office to schedule shoulder surgery.    He should be seen one week prior to the surgery simply to reexamine.      Please schedule for surgery, pre op H&P, and post ops.      Patient Name:  Ethan Espinosa ([id number]).  :  1960  Gender:  male  Patient Type:  Same Day Surgery  Surgeon:  Florentino Lawrence MD  Physician requests assist from:  PA    Procedures:    Arthroscopy of the right shoulder with probable open rotator cuff repair.   Approach:  NA  Diagnosis:  Complete tear of left rotator cuff  C-arm:   No  Mini C-arm:   No  Pathology Scheduled:  No  Special instruments/supplies:  Arthrex  Vendor Rep:  Ar[employer]  Anesthesia:  General  Block:  Yes - Given by  CRNA  Block type: Shoulder  Time needed:  120 minutes    [facility] Discussed:  Not Applicable    Post op 1:                  Return to clinic 1 week prior to surgery    Florentino Lawrence MD    Again, thank you for allowing me to participate in the care of your patient.        Sincerely,        Florentino Lawrence MD

## 2017-10-04 DIAGNOSIS — N52.8 OTHER MALE ERECTILE DYSFUNCTION: ICD-10-CM

## 2017-10-04 RX ORDER — SILDENAFIL 100 MG/1
100 TABLET, FILM COATED ORAL DAILY PRN
Qty: 4 TABLET | Refills: 3 | Status: SHIPPED | OUTPATIENT
Start: 2017-10-04 | End: 2018-11-29

## 2017-10-04 NOTE — TELEPHONE ENCOUNTER
Viagra      Last Written Prescription Date:  12/16/16  Last Fill Quantity: 4,   # refills: 3  Last Office Visit with [facility] prescribing provider: 6/9/17  Future Office visit:       Routing refill request to provider for review/approval because:  Drug not on the [facility] refill protocol or controlled substance  Elle Mendez MA 10/4/2017

## 2017-12-07 ENCOUNTER — RADIANT APPOINTMENT (OUTPATIENT)
Dept: GENERAL RADIOLOGY | Facility: CLINIC | Age: 57
End: 2017-12-07
Attending: ORTHOPAEDIC SURGERY
Payer: COMMERCIAL

## 2017-12-07 ENCOUNTER — OFFICE VISIT (OUTPATIENT)
Dept: ORTHOPEDICS | Facility: CLINIC | Age: 57
End: 2017-12-07
Payer: COMMERCIAL

## 2017-12-07 ENCOUNTER — HOSPITAL ENCOUNTER (OUTPATIENT)
Facility: CLINIC | Age: 57
End: 2017-12-07
Attending: ORTHOPAEDIC SURGERY | Admitting: ORTHOPAEDIC SURGERY
Payer: COMMERCIAL

## 2017-12-07 VITALS — HEIGHT: 72 IN | BODY MASS INDEX: 31.42 KG/M2 | WEIGHT: 232 LBS | TEMPERATURE: 98.2 F

## 2017-12-07 DIAGNOSIS — M25.512 LEFT SHOULDER PAIN, UNSPECIFIED CHRONICITY: ICD-10-CM

## 2017-12-07 DIAGNOSIS — M75.121 COMPLETE TEAR OF RIGHT ROTATOR CUFF: Primary | ICD-10-CM

## 2017-12-07 DIAGNOSIS — M75.42 IMPINGEMENT SYNDROME OF SHOULDER REGION, LEFT: ICD-10-CM

## 2017-12-07 PROCEDURE — 99214 OFFICE O/P EST MOD 30 MIN: CPT | Performed by: ORTHOPAEDIC SURGERY

## 2017-12-07 PROCEDURE — 73030 X-RAY EXAM OF SHOULDER: CPT | Mod: TC

## 2017-12-07 ASSESSMENT — PAIN SCALES - GENERAL: PAINLEVEL: MODERATE PAIN (4)

## 2017-12-07 NOTE — NURSING NOTE
Chief Complaint   Patient presents with     RECHECK     NEW ISSUE: Left shoulder pain       Initial Temp 98.2  F (36.8  C) (Temporal)  Ht 1.829 m (6')  Wt 105.2 kg (232 lb)  BMI 31.46 kg/m2 Estimated body mass index is 31.46 kg/(m^2) as calculated from the following:    Height as of this encounter: 1.829 m (6').    Weight as of this encounter: 105.2 kg (232 lb).  Medication Reconciliation: complete   VADIM Grant

## 2017-12-07 NOTE — LETTER
12/7/2017         RE: Ethan Espinosa  [address] [facility] [address]        Dear Colleague,    Thank you for referring your patient, Ethan Espinosa, to the [facility]. Please see a copy of my visit note below.    Office Visit-Follow up  HISTORY OF PRESENT ILLNESS:    Ethan Espinosa is a 57 year old male who is seen in follow up for   Chief Complaint   Patient presents with     RECHECK     NEW ISSUE: Left shoulder pain       Patient presents with:  RECHECK: NEW ISSUE: Left shoulder pain      Patient returns to discuss left shoulder problem.  However he brings up to other issues.  He would like to schedule his right shoulder surgery.  He would like to have another prescription for a right knee  brace.  He claims he is contacted his insurance and that this would be covered given that the first brace was fabricated 2 years ago for   Present symptoms: The present left shoulder symptoms are pain with reaching out and overhead.    Treatments tried to this point: No treatment for the left shoulder.    REVIEW OF SYSTEMS    General: negative for, night sweats, dizziness, fatigue  Resp: No shortness of breath and no cough  CV: negative for chest pain, syncope or near-syncope  GI: negative for nausea, vomiting and diarrhea  : negative for dysuria and hematuria  Musculoskeletal: as above  Neurologic: negative for syncope   Hematologic: negative for bleeding disorder    Physical Exam:    Vitals: Temp 98.2  F (36.8  C) (Temporal)  Ht 1.829 m (6')  Wt 105.2 kg (232 lb)  BMI 31.46 kg/m2  BMI= Body mass index is 31.46 kg/(m^2).  Constitutional: healthy, alert and no acute distress   Psychiatric: mentation appears normal and affect normal/bright  NEURO: no focal deficits  SKIN: no excoriation or erythema. No signs of infection.    GAIT: At present his gait pattern is smooth.  He is status post hip replacement.  He has right knee arthrosis.    JOINT/EXTREMITIES:     Left shoulder  examination shows no gross limitations to forward elevation and side abduction arm at side external rotation and even behind back internal rotation.  Strength testing of the left shoulder shows mild discomfort and pain with supraspinatus testing primarily.    Comparing to the right shoulder he has marked discomfort and weakness with supraspinatus testing.  Abdominal liftoff testing on the right also causes discomfort.     IMAGING INTERPRETATION: X-rays of the left shoulder show AC joint irregularity, type III acromion, and some degree of sclerosis at the greater tuberosity.       Impression:      ICD-10-CM    1. Complete tear of right rotator cuff M75.121    2. Impingement syndrome of shoulder region, left M75.42 PHYSICAL THERAPY REFERRAL   3. Osteoarthrosis involving lower leg M17.10 ORTHOTICS REFERRAL       Patient's physical findings of the left shoulder today are very consistent with impingement.    He has a MRI documented right rotator cuff tear involving the supraspinatus and subscapularis.    He has radiographic evidence of right knee degenerative arthritis.      Plan:   The above was reviewed with Ethan.     We explained how his left shoulder contrast with respect to the right mainly in the absence of more significant pathologic clinical findings.    At this point we will refer him to physical therapy for his left shoulder.  We also instructed him on the therapeutic dose of ibuprofen for 1 week.  Hopefully this will be enough to get symptoms to settle down.    He requests the right shoulder surgery be scheduled.  We will try and get this accommodated.  We will write a prescription for his  brace on the right side.      Return to clinic I suggest that I see him 1 week prior to his right shoulder surgery.  His surgery is being scheduled 2 months out.  This would give us the opportunity to make sure that he understands the surgical process and recovery.    Florentino Lawrence MD    Please schedule for  surgery, pre op H&P, and post ops.      Patient Name:  Ethan Espinosa ([id number]).  :  1960  Gender:  male  Patient Type:  Same Day Surgery  Surgeon:  Florentino Lawrence MD  Physician requests assist from:  PA    Procedures:    Right shoulder arthroscopy with repair of supraspinatus and subscapularis tendons.  Anticipate mini open repair.  Approach:  NA  Diagnosis:     Complete tear of right rotator cuff    C-arm:  No  Mini C-arm:   No  Pathology Scheduled:  No  Special instruments/supplies: Arthrex anchors  Vendor Rep: [employer]  Anesthesia:  General  Block:  Yes - Given by  CRNA  Block type: Shoulder  Time needed:  120 minutes    [facility] Discussed:  Not Applicable    Post op 1:              Again, thank you for allowing me to participate in the care of your patient.        Sincerely,        Florentino Lawrence MD

## 2017-12-07 NOTE — PROGRESS NOTES
Office Visit-Follow up  HISTORY OF PRESENT ILLNESS:    Ethan Espinosa is a 57 year old male who is seen in follow up for   Chief Complaint   Patient presents with     RECHECK     NEW ISSUE: Left shoulder pain       Patient presents with:  RECHECK: NEW ISSUE: Left shoulder pain      Patient returns to discuss left shoulder problem.  However he brings up to other issues.  He would like to schedule his right shoulder surgery.  He would like to have another prescription for a right knee  brace.  He claims he is contacted his insurance and that this would be covered given that the first brace was fabricated 2 years ago for   Present symptoms: The present left shoulder symptoms are pain with reaching out and overhead.    Treatments tried to this point: No treatment for the left shoulder.    REVIEW OF SYSTEMS    General: negative for, night sweats, dizziness, fatigue  Resp: No shortness of breath and no cough  CV: negative for chest pain, syncope or near-syncope  GI: negative for nausea, vomiting and diarrhea  : negative for dysuria and hematuria  Musculoskeletal: as above  Neurologic: negative for syncope   Hematologic: negative for bleeding disorder    Physical Exam:    Vitals: Temp 98.2  F (36.8  C) (Temporal)  Ht 1.829 m (6')  Wt 105.2 kg (232 lb)  BMI 31.46 kg/m2  BMI= Body mass index is 31.46 kg/(m^2).  Constitutional: healthy, alert and no acute distress   Psychiatric: mentation appears normal and affect normal/bright  NEURO: no focal deficits  SKIN: no excoriation or erythema. No signs of infection.    GAIT: At present his gait pattern is smooth.  He is status post hip replacement.  He has right knee arthrosis.    JOINT/EXTREMITIES:     Left shoulder examination shows no gross limitations to forward elevation and side abduction arm at side external rotation and even behind back internal rotation.  Strength testing of the left shoulder shows mild discomfort and pain with supraspinatus testing  primarily.    Comparing to the right shoulder he has marked discomfort and weakness with supraspinatus testing.  Abdominal liftoff testing on the right also causes discomfort.     IMAGING INTERPRETATION: X-rays of the left shoulder show AC joint irregularity, type III acromion, and some degree of sclerosis at the greater tuberosity.       Impression:      ICD-10-CM    1. Complete tear of right rotator cuff M75.121    2. Impingement syndrome of shoulder region, left M75.42 PHYSICAL THERAPY REFERRAL   3. Osteoarthrosis involving lower leg M17.10 ORTHOTICS REFERRAL       Patient's physical findings of the left shoulder today are very consistent with impingement.    He has a MRI documented right rotator cuff tear involving the supraspinatus and subscapularis.    He has radiographic evidence of right knee degenerative arthritis.      Plan:   The above was reviewed with Ethan.     We explained how his left shoulder contrast with respect to the right mainly in the absence of more significant pathologic clinical findings.    At this point we will refer him to physical therapy for his left shoulder.  We also instructed him on the therapeutic dose of ibuprofen for 1 week.  Hopefully this will be enough to get symptoms to settle down.    He requests the right shoulder surgery be scheduled.  We will try and get this accommodated.  We will write a prescription for his  brace on the right side.      Return to clinic I suggest that I see him 1 week prior to his right shoulder surgery.  His surgery is being scheduled 2 months out.  This would give us the opportunity to make sure that he understands the surgical process and recovery.    Florentino Lawrence MD    Please schedule for surgery, pre op H&P, and post ops.      Patient Name:  Ethan Espinosa ([id number]).  :  1960  Gender:  male  Patient Type:  Same Day Surgery  Surgeon:  Florentino Lawrence MD  Physician requests assist from:  PA    Procedures:    Right  shoulder arthroscopy with repair of supraspinatus and subscapularis tendons.  Anticipate mini open repair.  Approach:  NA  Diagnosis:     Complete tear of right rotator cuff    C-arm:  No  Mini C-arm:   No  Pathology Scheduled:  No  Special instruments/supplies: Ar[employer] anchors  Vendor Rep: [employer]  Anesthesia:  General  Block:  Yes - Given by  CRNA  Block type: Shoulder  Time needed:  120 minutes    [facility] Discussed:  Not Applicable    Post op 1:

## 2017-12-07 NOTE — MR AVS SNAPSHOT
"              After Visit Summary   2017    Ethan Espinosa    MRN: [id number]           Patient Information     Date Of Birth          1960        Visit Information        Provider Department      2017 8:00 AM Florentino Lawrence MD [facility]        Today's Diagnoses     Left shoulder pain, unspecified chronicity    -  1       Follow-ups after your visit        Who to contact     If you have questions or need follow up information about today's clinic visit or your schedule please contact [facility] directly at [phone].  Normal or non-critical lab and imaging results will be communicated to you by [employer], letter or phone within 4 business days after the clinic has received the results. If you do not hear from us within 7 days, please contact the clinic through [employer] or phone. If you have a critical or abnormal lab result, we will notify you by phone as soon as possible.  Submit refill requests through [employer] or call your pharmacy and they will forward the refill request to us. Please allow 3 business days for your refill to be completed.          Additional Information About Your Visit        MyChart Information     [employer] lets you send messages to your doctor, view your test results, renew your prescriptions, schedule appointments and more. To sign up, go to [email or web address] . Click on \"Log in\" on the left side of the screen, which will take you to the Welcome page. Then click on \"Sign up Now\" on the right side of the page.     You will be asked to enter the access code listed below, as well as some personal information. Please follow the directions to create your username and password.     Your access code is: [email or web address]  Expires: 3/7/2018  8:22 AM     Your access code will  in 90 days. If you need help or a new code, please call your [facility] or [phone].        Care EveryWhere ID     This is your Care EveryWhere ID. This could " be used by other organizations to access your [facility] medical records  [id number]        Your Vitals Were     Height BMI (Body Mass Index)                1.829 m (6') 31.46 kg/m2           Blood Pressure from Last 3 Encounters:   06/09/17 (!) 184/92   01/27/17 (!) 158/94   01/10/17 139/57    Weight from Last 3 Encounters:   12/07/17 105.2 kg (232 lb)   08/03/17 106.6 kg (235 lb)   08/01/17 106.6 kg (235 lb)               Primary Care Provider Office Phone # Fax #    Gregory G Schoen, [phone] [address] [place] [address]        Equal Access to Services     ASHANTI DRUMMOND : Hadii aad ku hadasho Soomaali, waaxda luqadaha, qaybta kaalmada adeegyada, waxnicole hanna haymeme shaw . So [facility] [phone].    ATENCIÓN: Si habla español, tiene a bob disposición servicios gratuitos de asistencia lingüística. Llame al [phone].    We comply with applicable federal civil rights laws and [place] laws. We do not discriminate on the basis of race, color, national origin, age, disability, sex, sexual orientation, or gender identity.            Thank you!     Thank you for choosing [facility]  for your care. Our goal is always to provide you with excellent care. Hearing back from our patients is one way we can continue to improve our services. Please take a few minutes to complete the written survey that you may receive in the mail after your visit with us. Thank you!             Your Updated Medication List - Protect others around you: Learn how to safely use, store and throw away your medicines at www.disposemymeds.org.          This list is accurate as of: 12/7/17  8:22 AM.  Always use your most recent med list.                   Brand Name Dispense Instructions for use Diagnosis    ANDROGEL 1% PUMP 12.5 MG/ACT (1%) gel   Generic drug:  testosterone     1 Month    Apply 1gm every morning    Low testosterone       lisinopril 10 MG tablet    PRINIVIL/ZESTRIL     90 tablet    Take 1 tablet (10 mg) by mouth daily    Essential hypertension       * order for DME      1 Device Respironics Auto CPAP @@ 7-12 cm via nasal mask per order of Dr. Headley.        * order for DME     1 unit marking on an U-100 insulin syringe    Equipment being ordered: TENS    Status post medial meniscus repair       sildenafil 100 MG tablet    VIAGRA    4 tablet    Take 1 tablet (100 mg) by mouth daily as needed Take 30 min to 4 hours before intercourse.  Never use with nitroglycerin, terazosin or doxazosin.    Other male erectile dysfunction       triamcinolone 0.1 % cream    KENALOG    30 g    Apply sparingly to affected area three times daily for 14 days or until rash cleared    Status post total replacement of left hip, Rash, skin       VITAMIN D (CHOLECALCIFEROL) PO      Take 1,000 Units by mouth daily Reported on 2/21/2017        * Notice:  This list has 2 medication(s) that are the same as other medications prescribed for you. Read the directions carefully, and ask your doctor or other care provider to review them with you.

## 2017-12-11 ENCOUNTER — HOSPITAL ENCOUNTER (OUTPATIENT)
Dept: PHYSICAL THERAPY | Facility: CLINIC | Age: 57
Setting detail: THERAPIES SERIES
End: 2017-12-11
Attending: ORTHOPAEDIC SURGERY
Payer: COMMERCIAL

## 2017-12-11 PROCEDURE — 97110 THERAPEUTIC EXERCISES: CPT | Mod: GP | Performed by: PHYSICAL THERAPIST

## 2017-12-11 PROCEDURE — 97161 PT EVAL LOW COMPLEX 20 MIN: CPT | Mod: GP | Performed by: PHYSICAL THERAPIST

## 2017-12-11 PROCEDURE — 40000718 ZZHC STATISTIC PT DEPARTMENT ORTHO VISIT: Performed by: PHYSICAL THERAPIST

## 2017-12-11 PROCEDURE — 97530 THERAPEUTIC ACTIVITIES: CPT | Mod: GP | Performed by: PHYSICAL THERAPIST

## 2017-12-11 NOTE — PROGRESS NOTES
12/11/17 0823   General Information   Type of Visit Initial OP Ortho PT Evaluation   Start of Care Date 12/11/17   Referring Physician Dr. Lawrence   Patient/Family Goals Statement Get exercises and get ready so that he can have R shoulder surgery   Orders Evaluate and Treat   Orders Comment Impingement program   Date of Order 12/07/17   Insurance Type [employer]   Insurance Comments/Visits Authorized Requires authorization. requested 6 visits   Medical Diagnosis Impingement syndrome of L shoulder   Surgical/Medical history reviewed Yes   Precautions/Limitations no known precautions/limitations   General Information Comments Known R RC tear- surgery scheduled for 2/2018   Body Part(s)   Body Part(s) Shoulder   Presentation and Etiology   Pertinent history of current problem (include personal factors and/or comorbidities that impact the POC) Initial injury to R shoulder was in July of 17 when he was using ayden to change a tire. Since then has been over using L shoulder and is now having L shoulder pain. Plan is to get left shoulder feeling better and into good maintenance mode so that he can have surgery on the R.  Pt denies any tingling and numbness in the arms or hands. Pt has been prescribed 1 week of therapeutic IBP per Dr. Lawrence.  Pt is a hobby farmer. He likes weight lifting and is intentional about nutrition. He takes supplements vitamin C Tumeric , protein powder... PMHX:  SANDRA and knee arthroscopy with Dr. Lawrence.    Impairments A. Pain;D. Decreased ROM;F. Decreased strength and endurance   Functional Limitations perform activities of daily living;perform required work activities;perform desired leisure / sports activities   Symptom Location (B) shoulder   How/Where did it occur With repetition/overuse;While lifting   Onset date of current episode/exacerbation 11/01/17   Chronicity New   Pain rating (0-10 point scale) Best (/10);Worst (/10)   Best (/10) 0   Worst (/10) 4   Pain quality C. Aching;A. Sharp    Frequency of pain/symptoms B. Intermittent   Pain/symptoms are: The same all the time   Pain/symptoms exacerbated by C. Lifting;D. Carrying;G. Certain positions;H. Overhead reach;K. Home tasks;L. Work tasks;J. ADL  (push / pull)   Pain/symptoms eased by C. Rest;H. Cold;F. Certain positions;E. Changing positions   Progression of symptoms since onset: Improved   Prior Level of Function   Prior Level of Function-Mobility indep   Prior Level of Function-ADLs indep   Functional Level Prior Comment indep   Current Level of Function   Current Community Support Family/friend caregiver   Patient role/employment history F. Retired;A. Employed   Employment Comments Farmer   Living environment House/townhome   Home/community accessibility no concerns   Current equipment-Gait/Locomotion None   Current equipment-ADL None   Fall Risk Screen   Fall screen completed by PT   Have you fallen 2 or more times in the past year? No   Have you fallen and had an injury in the past year? No   Is patient a fall risk? No   Shoulder Objective Findings   Side (if bilateral, select both right and left) Left   Observation Good muscle bulk in shoulders and UE's   Integumentary  no concerns   Posture slightly forward head,   Cervical Screen (ROM, quadrant) negative quadrant test   Scapulothoracic Rhythm WFL (B)   Pec Minor (supine) Flexibility good, elbows to mat in supine   Neer's Test R neg, L neg   Ang-José Luis Test (B) positive   [place]'s Test negaative (B)   Load and Shift Test negative   Sulcus Test negative   Crossover Test positive (B)   Shoulder Special Tests Comments Speeds positive (B)   Palpation TTP over supraspinatus and biceps tendons (B). Decreased mm bulk of supraspinatus on R vs. L.  TTP primarily at Anterior shoulder.   Left Shoulder Flexion AROM full (B) Painful on L   Left Shoulder Abduction AROM full (B) painful and crepitus (B) R worse than L at 90 degrees   Left Shoulder ER AROM T-3 (B) pain on R   Left Shoulder IR AROM  L3 on the R T10 on the L   Left Shoulder Flexion Strength R 5/5, L painful 4/5   Left Shoulder Abduction Strength R 5/5 painful, 5/5 on L   Left Shoulder ER Strength 5/5 (B)   Left Shoulder IR Strength lift off 3/5 on R 5/5 L   Left Shoulder Extension Strength 5/5   Planned Therapy Interventions   Planned Therapy Interventions manual therapy;strengthening;ROM;stretching;neuromuscular re-education   Planned Modality Interventions   Planned Modality Interventions Ultrasound   Planned Modality Interventions Comments US to L biceps and Supraspinatus tendons   Clinical Impression   Criteria for Skilled Therapeutic Interventions Met yes, treatment indicated   PT Diagnosis Pain, inhibited strength and decreased flexibility from pt's baseline consistent with impingement of the supraspinatus and biceps tendons.    Influenced by the following impairments pain, inibited strength and decresaed flexibility   Functional limitations due to impairments pain with ADLS and IADLS   Clinical Presentation Stable/Uncomplicated   Clinical Presentation Rationale Pt is otherwise medically stable and healthy   Clinical Decision Making (Complexity) Low complexity   Therapy Frequency 2 times/Week   Predicted Duration of Therapy Intervention (days/wks) 3 weeks   Risk & Benefits of therapy have been explained Yes   Patient, Family & other staff in agreement with plan of care Yes   Clinical Impression Comments Pt presents with Pain, inhibited strength and decreased flexibility from pt's baseline consistent with impingement of the supraspinatus and biceps tendons. Pt will benefit from skilled PT for instruction in HEP for stretching, strengthening and RC stabilization.  Pt will also benefit from skilled application of manual techniques and modalities to  decrease pain and promote the healing process.    Education Assessment   Preferred Learning Style Demonstration;Pictures/video   Barriers to Learning No barriers   ORTHO GOALS   PT Ortho Eval  Goals 1;2;3   Ortho Goal 1   Goal Identifier HEP   Goal Description Pt indep with HEP for stretching and strengthening and ice to improve movement patterns and decrease pain.    Target Date 01/10/18   Ortho Goal 2   Goal Identifier Lifting   Goal Description Pt able to lift 15 lbs  with no pain with L ue in order to perform some chores and self care with L UE   Target Date 02/09/18   Ortho Goal 3   Goal Identifier ADLS   Goal Description Pt able to perfrom all self care and light house chores with no pain in L shoulder   Target Date 01/10/18   Total Evaluation Time   Total Evaluation Time 30       Discharge per initial evaluation. Pt did not return for further therapy.

## 2017-12-19 ENCOUNTER — TELEPHONE (OUTPATIENT)
Dept: ORTHOPEDICS | Facility: CLINIC | Age: 57
End: 2017-12-19

## 2017-12-19 NOTE — TELEPHONE ENCOUNTER
Surgery Scheduled    Date of Surgery 02/28/17 Time of Surgery   Procedure: Right shoulder arthroscopy with repair of supraspinatus and subscapularis tendons. Anticipate mini open repair  Hospital/Surgical Facility: [facility]  Surgeon: Dr. Lawrence  Type of Anesthesia : General  Pre-op patient will call and schedule with PCP  Post op:03/13/18 with Dr. Lawrence        Surgery packet mailed to patient's home address. Patients instructed to arrive 1 hours prior to surgery. Patient understood and agrees to plan.    Tati Case  Surgery Scheduler

## 2018-02-03 ENCOUNTER — OFFICE VISIT (OUTPATIENT)
Dept: URGENT CARE | Facility: RETAIL CLINIC | Age: 58
End: 2018-02-03
Payer: COMMERCIAL

## 2018-02-03 VITALS
TEMPERATURE: 98.9 F | SYSTOLIC BLOOD PRESSURE: 169 MMHG | HEART RATE: 67 BPM | OXYGEN SATURATION: 98 % | DIASTOLIC BLOOD PRESSURE: 90 MMHG

## 2018-02-03 DIAGNOSIS — J20.9 ACUTE BRONCHITIS WITH SYMPTOMS > 10 DAYS: ICD-10-CM

## 2018-02-03 DIAGNOSIS — R05.9 COUGH: Primary | ICD-10-CM

## 2018-02-03 PROCEDURE — 99203 OFFICE O/P NEW LOW 30 MIN: CPT | Performed by: NURSE PRACTITIONER

## 2018-02-03 RX ORDER — CODEINE PHOSPHATE AND GUAIFENESIN 10; 100 MG/5ML; MG/5ML
1 SOLUTION ORAL EVERY 4 HOURS PRN
Qty: 120 ML | Refills: 0 | Status: SHIPPED | OUTPATIENT
Start: 2018-02-03 | End: 2021-06-21

## 2018-02-03 RX ORDER — ALBUTEROL SULFATE 90 UG/1
2 AEROSOL, METERED RESPIRATORY (INHALATION) EVERY 6 HOURS PRN
Qty: 1 INHALER | Refills: 0 | Status: SHIPPED | OUTPATIENT
Start: 2018-02-03 | End: 2021-06-21

## 2018-02-03 RX ORDER — BENZONATATE 100 MG/1
100-200 CAPSULE ORAL 3 TIMES DAILY PRN
Qty: 42 CAPSULE | Refills: 0 | Status: SHIPPED | OUTPATIENT
Start: 2018-02-03 | End: 2021-06-21

## 2018-02-03 RX ORDER — AZITHROMYCIN 250 MG/1
TABLET, FILM COATED ORAL
Qty: 6 TABLET | Refills: 0 | Status: SHIPPED | OUTPATIENT
Start: 2018-02-03 | End: 2018-02-08

## 2018-02-03 NOTE — PROGRESS NOTES
SUBJECTIVE:  Ethan Espinosa is a 57 year old male who presents to the clinic today with a chief complaint of cough , shortness of breath., wheezing. and it is difficult to breath (more on the exhale) for 3 week(s).  Was very sick around the 10th of January.  His cough is described as persistent, productive phlegm at times, spasmodic and wheezing.    The patient's symptoms are moderate and worsening.  Associated symptoms include congestion, nasal congestion (using neti pot), malaise, sore throat, wheezing, headache and exhale . The patient's symptoms are exacerbated by lying down and exhale.  Patient has been using OTC cough suppressants and Mucinex to improve symptoms.    Past Medical History:   Diagnosis Date     Diverticulosis of colon (without mention of hemorrhage)     D/C 07/11/09     Lyme disease      Current Outpatient Prescriptions   Medication Sig Dispense Refill     sildenafil (VIAGRA) 100 MG tablet Take 1 tablet (100 mg) by mouth daily as needed Take 30 min to 4 hours before intercourse.  Never use with nitroglycerin, terazosin or doxazosin. 4 tablet 3     lisinopril (PRINIVIL/ZESTRIL) 10 MG tablet Take 1 tablet (10 mg) by mouth daily 90 tablet 3     ORDER FOR DME Equipment being ordered: TENS 1 unit marking on an U-100 insulin syringe 0     VITAMIN D, CHOLECALCIFEROL, PO Take 1,000 Units by mouth daily Reported on 2/21/2017       ORDER FOR DME 1 Device Respironics Auto CPAP @@ 7-12 cm via nasal mask per order of Dr. Headley.       triamcinolone (KENALOG) 0.1 % cream Apply sparingly to affected area three times daily for 14 days or until rash cleared (Patient not taking: Reported on 2/3/2018) 30 g 3     Testosterone (ANDROGEL PUMP) 1.25 GM/ACT (1%) GEL Apply 1gm every morning 1 Month 11     History   Smoking Status     Former Smoker     Packs/day: 0.50     Years: 12.00     Types: Cigarettes   Smokeless Tobacco     Never Used     Comment: 1993 quit       ROS  See chart, otherwise.  Review of  systems negative except as stated above.    OBJECTIVE:  /90  Pulse 67  Temp 98.9  F (37.2  C) (Oral)  SpO2 98%  GENERAL APPEARANCE: alert, active, moderate distress and cooperative  EYES: EOMI,  PERRL, conjunctiva clear  HENT: ear canals and TM's normal.  Nose and mouth without ulcers, erythema or lesions  NECK: bilateral anterior cervical adenopathy  RESP: expiratory wheezes throughout and inspiratory wheezes throughout  CV: regular rates and rhythm, normal S1 S2, no murmur noted  ABDOMEN:  soft, nontender, no HSM or masses and bowel sounds normal  NEURO: Normal strength and tone, sensory exam grossly normal,  normal speech and mentation  SKIN: no suspicious lesions or rashes    ASSESSMENT:     Cough  Acute bronchitis with symptoms > 10 days      PLAN:  Current Outpatient Prescriptions   Medication     benzonatate (TESSALON) 100 MG capsule     guaiFENesin-codeine (ROBITUSSIN AC) 100-10 MG/5ML SOLN solution     azithromycin (ZITHROMAX) 250 MG tablet     albuterol (PROAIR HFA/PROVENTIL HFA/VENTOLIN HFA) 108 (90 BASE) MCG/ACT Inhaler     sildenafil (VIAGRA) 100 MG tablet     lisinopril (PRINIVIL/ZESTRIL) 10 MG tablet     ORDER FOR DME     VITAMIN D, CHOLECALCIFEROL, PO     ORDER FOR DME     triamcinolone (KENALOG) 0.1 % cream     Testosterone (ANDROGEL PUMP) 1.25 GM/ACT (1%) GEL     No current facility-administered medications for this visit.      Get plenty of rest & drink plenty of fluids (mainly water).  Take OTC, or medications prescribed to treat symptoms.  Mucinex is product known to help loosen congestion (generics are available.).   Dark Honey, such as [place] Wheat Honey has been shown to be helpful in cough management.  Avoid smoke (cigarettes or fireplace/wood burning stoves).  If you develop trouble breathing, swallowing or cough-up blood, immediately go to ER.  Using a vaporizer, humidifier, or steam from hot water to add moisture to the air can help  Follow-up with primary care provider if not  improving with in 3 days or symptoms worsen.  A cough may last up to 2 weeks.    Sabino Ragland MSN, APRN, Family NP-C  [facility]  February 3, 2018

## 2018-02-03 NOTE — MR AVS SNAPSHOT
"              After Visit Summary   2/3/2018    Ethan Espinosa    MRN: [id number]           Patient Information     Date Of Birth          1960        Visit Information        Provider Department      2/3/2018 9:00 AM Sabino Ragland APRN [facility]        Today's Diagnoses     Cough    -  1    Acute bronchitis with symptoms > 10 days           Follow-ups after your visit        Your next 10 appointments already scheduled     2018   Procedure with Florentino Lawrence MD   [facility] ([facility])    [address] [id number] [phone]           From [address]: Exit at [address] on south side of [place]. Turn right on [address]. Turn left at stoplight on [address]. [facility] will be in view two blocks ahead            Mar 13, 2018  8:30 AM CDT   Return Visit with Florentino Lawrence MD   [facility]    [address] [id number] [phone]              Who to contact     You can reach your care team any time of the day by calling [phone].  Notification of test results:  If you have an abnormal lab result, we will notify you by phone as soon as possible.         Additional Information About Your Visit        MyChart Information     [employer] lets you send messages to your doctor, view your test results, renew your prescriptions, schedule appointments and more. To sign up, go to [email or web address] . Click on \"Log in\" on the left side of the screen, which will take you to the Welcome page. Then click on \"Sign up Now\" on the right side of the page.     You will be asked to enter the access code listed below, as well as some personal information. Please follow the directions to create your username and password.     Your access code is: [email or web address]  Expires: 3/7/2018  8:22 AM     Your access code will  in 90 " days. If you need help or a new code, please call your [facility] clinic or [phone].        Care EveryWhere ID     This is your Care EveryWhere ID. This could be used by other organizations to access your [facility] medical records  [id number]        Your Vitals Were     Pulse Temperature Pulse Oximetry             67 98.9  F (37.2  C) (Oral) 98%          Blood Pressure from Last 3 Encounters:   02/03/18 169/90   06/09/17 (!) 184/92   01/27/17 (!) 158/94    Weight from Last 3 Encounters:   12/07/17 232 lb (105.2 kg)   08/03/17 235 lb (106.6 kg)   08/01/17 235 lb (106.6 kg)              Today, you had the following     No orders found for display         Today's Medication Changes          These changes are accurate as of 2/3/18  9:32 AM.  If you have any questions, ask your nurse or doctor.               Start taking these medicines.        Dose/Directions    albuterol 108 (90 BASE) MCG/ACT Inhaler   Commonly known as:  PROAIR HFA/PROVENTIL HFA/VENTOLIN HFA   Used for:  Cough, Acute bronchitis with symptoms > 10 days   Started by:  Sabino Ragland APRN CNP        Dose:  2 puff   Inhale 2 puffs into the lungs every 6 hours as needed for shortness of breath / dyspnea or wheezing   Quantity:  1 Inhaler   Refills:  0       azithromycin 250 MG tablet   Commonly known as:  ZITHROMAX   Used for:  Acute bronchitis with symptoms > 10 days   Started by:  Sabino Ragland APRN CNP        Take 2 tablets today, then take 1 tablet for the next 4 days.   Quantity:  6 tablet   Refills:  0       benzonatate 100 MG capsule   Commonly known as:  TESSALON   Used for:  Cough   Started by:  Sabino Ragland APRN CNP        Dose:  100-200 mg   Take 1-2 capsules (100-200 mg) by mouth 3 times daily as needed   Quantity:  42 capsule   Refills:  0       guaiFENesin-codeine 100-10 MG/5ML Soln solution   Commonly known as:  ROBITUSSIN AC   Used for:  Cough   Started by:  Sabino Ragland APRN CNP        Dose:  1 tsp.   Take 5  mLs by mouth every 4 hours as needed for cough   Quantity:  120 mL   Refills:  0            Where to get your medicines      These medications were sent to [facility] [place] - [place], [place] - [address]  [address], [facility] [address]     Phone:  [phone]     albuterol 108 (90 BASE) MCG/ACT Inhaler    azithromycin 250 MG tablet    benzonatate 100 MG capsule         Some of these will need a paper prescription and others can be bought over the counter.  Ask your nurse if you have questions.     Bring a paper prescription for each of these medications     guaiFENesin-codeine 100-10 MG/5ML Soln solution                Primary Care Provider Office Phone # Fax #    Gregory G Schoen, [phone]       [address] [place] [address]        Equal Access to Services     [facility]: Hadii aad alena hadasho Soomaali, waaxda luqadaha, qaybta kaalmada adeegyada, waxnicole hanna haymeme shaw . So [facility] [phone].    ATENCIÓN: Si habla español, tiene a bob disposición servicios gratuitos de asistencia lingüística. [facility] [phone].    We comply with applicable federal civil rights laws and [place] laws. We do not discriminate on the basis of race, color, national origin, age, disability, sex, sexual orientation, or gender identity.            Thank you!     Thank you for choosing [facility]  for your care. Our goal is always to provide you with excellent care. Hearing back from our patients is one way we can continue to improve our services. Please take a few minutes to complete the written survey that you may receive in the mail after your visit with us. Thank you!             Your Updated Medication List - Protect others around you: Learn how to safely use, store and throw away your medicines at www.disposemymeds.org.          This list is accurate as of 2/3/18  9:32 AM.  Always use your most recent med list.                   Brand Name Dispense  Instructions for use Diagnosis    albuterol 108 (90 BASE) MCG/ACT Inhaler    PROAIR HFA/PROVENTIL HFA/VENTOLIN HFA    1 Inhaler    Inhale 2 puffs into the lungs every 6 hours as needed for shortness of breath / dyspnea or wheezing    Cough, Acute bronchitis with symptoms > 10 days       ANDROGEL 1% PUMP 12.5 MG/ACT (1%) gel   Generic drug:  testosterone     1 Month    Apply 1gm every morning    Low testosterone       azithromycin 250 MG tablet    ZITHROMAX    6 tablet    Take 2 tablets today, then take 1 tablet for the next 4 days.    Acute bronchitis with symptoms > 10 days       benzonatate 100 MG capsule    TESSALON    42 capsule    Take 1-2 capsules (100-200 mg) by mouth 3 times daily as needed    Cough       guaiFENesin-codeine 100-10 MG/5ML Soln solution    ROBITUSSIN AC    120 mL    Take 5 mLs by mouth every 4 hours as needed for cough    Cough       lisinopril 10 MG tablet    PRINIVIL/ZESTRIL    90 tablet    Take 1 tablet (10 mg) by mouth daily    Essential hypertension       * order for DME      1 Device Respironics Auto CPAP @@ 7-12 cm via nasal mask per order of Dr. Headley.        * order for DME     1 unit marking on an U-100 insulin syringe    Equipment being ordered: TENS    Status post medial meniscus repair       sildenafil 100 MG tablet    VIAGRA    4 tablet    Take 1 tablet (100 mg) by mouth daily as needed Take 30 min to 4 hours before intercourse.  Never use with nitroglycerin, terazosin or doxazosin.    Other male erectile dysfunction       triamcinolone 0.1 % cream    KENALOG    30 g    Apply sparingly to affected area three times daily for 14 days or until rash cleared    Status post total replacement of left hip, Rash, skin       VITAMIN D (CHOLECALCIFEROL) PO      Take 1,000 Units by mouth daily Reported on 2/21/2017        * Notice:  This list has 2 medication(s) that are the same as other medications prescribed for you. Read the directions carefully, and ask your doctor or other care  provider to review them with you.

## 2018-02-03 NOTE — NURSING NOTE
Chief Complaint   Patient presents with     Cough     x 3 weeks, now having a hard time breathing while sleeping and while breathing out       Initial /87  Pulse 67  Temp 98.9  F (37.2  C) (Oral)  SpO2 98% Estimated body mass index is 31.46 kg/(m^2) as calculated from the following:    Height as of 12/7/17: 6' (1.829 m).    Weight as of 12/7/17: 232 lb (105.2 kg).  Medication Reconciliation: complete   Kellie De Paz

## 2018-02-23 RX ORDER — CLINDAMYCIN PHOSPHATE 900 MG/50ML
900 INJECTION, SOLUTION INTRAVENOUS
Status: CANCELLED | OUTPATIENT
Start: 2018-02-23

## 2018-02-23 RX ORDER — CLINDAMYCIN PHOSPHATE 900 MG/50ML
900 INJECTION, SOLUTION INTRAVENOUS SEE ADMIN INSTRUCTIONS
Status: CANCELLED | OUTPATIENT
Start: 2018-02-23

## 2018-02-26 ENCOUNTER — TELEPHONE (OUTPATIENT)
Dept: ORTHOPEDICS | Facility: CLINIC | Age: 58
End: 2018-02-26

## 2018-02-26 NOTE — TELEPHONE ENCOUNTER
Reason for Call:  Other call back    Detailed comments: Patient calling to cancel surgery with Dr Lawrence on Wednesday this week.  Please call him as soon as possible.  Thanks    Phone Number Patient can be reached at: Home number on file [phone] (home)    Best Time: any    Can we leave a detailed message on this number? YES    Call taken on 2/26/2018 at 4:32 PM by Danielle Smith

## 2018-02-27 NOTE — TELEPHONE ENCOUNTER
Patient called to cancel surgery. He said that that his blood pressure is really high and his legs are throbbing. He is concerned about these symptoms so he is going to call his PCP right away to see if he can be seen. I offered to transfer to a nurse but he is said that as soon as he hung up with me he was going to call his doctor in [place]. Notified Jaquelin in the OR.

## 2018-08-04 DIAGNOSIS — I10 ESSENTIAL HYPERTENSION: ICD-10-CM

## 2018-08-06 RX ORDER — LISINOPRIL 10 MG/1
TABLET ORAL
Qty: 90 TABLET | Refills: 3 | Status: SHIPPED | OUTPATIENT
Start: 2018-08-06 | End: 2020-11-27

## 2018-08-06 NOTE — TELEPHONE ENCOUNTER
"Last Written Prescription Date:  6/09/2017  Last Fill Quantity: 90,  # refills: 3   Last office visit: 6/9/2017 with prescribing provider:  Dr. Schoen   Future Office Visit:    Requested Prescriptions   Pending Prescriptions Disp Refills     lisinopril (PRINIVIL/ZESTRIL) 10 MG tablet [Pharmacy Med Name: LISINOPRIL 10MG TABS] 90 tablet 3     Sig: TAKE ONE TABLET BY MOUTH DAILY.    ACE Inhibitors (Including Combos) Protocol Failed    8/4/2018  1:01 AM       Failed - Blood pressure under 140/90 in past 12 months    BP Readings from Last 3 Encounters:   02/03/18 169/90   06/09/17 (!) 184/92   01/27/17 (!) 158/94                Failed - Recent (12 mo) or future (30 days) visit within the authorizing provider's specialty    Patient had office visit in the last 12 months or has a visit in the next 30 days with authorizing provider or within the authorizing provider's specialty.  See \"Patient Info\" tab in inbasket, or \"Choose Columns\" in Meds & Orders section of the refill encounter.           Failed - Normal serum creatinine on file in past 12 months    Recent Labs   Lab Test  06/09/17   1500   CR  0.90            Failed - Normal serum potassium on file in past 12 months    Recent Labs   Lab Test  06/09/17   1500   POTASSIUM  4.2            Passed - Patient is age 18 or older          "

## 2018-08-06 NOTE — TELEPHONE ENCOUNTER
Routing refill request to provider for review/approval because:  Labs not current:  Creatinine, Potassium.   Patient needs to be seen because it has been more than 1 year since last office visit.  Blood pressures are elevated above goal.     Vane Patel RN

## 2018-08-14 NOTE — ADDENDUM NOTE
Encounter addended by: Ana Schilling, PT on: 8/14/2018  1:57 PM<BR>     Actions taken: Sign clinical note, Episode resolved

## 2018-11-29 DIAGNOSIS — N52.8 OTHER MALE ERECTILE DYSFUNCTION: ICD-10-CM

## 2018-11-30 NOTE — TELEPHONE ENCOUNTER
Viagra  Routing refill request to provider for review/approval because:  Patient needs to be seen because it has been more than 1 year since last office visit.    Nitza Ruiz, RN, BSN

## 2018-12-03 RX ORDER — SILDENAFIL 100 MG/1
100 TABLET, FILM COATED ORAL DAILY PRN
Qty: 4 TABLET | Refills: 3 | Status: SHIPPED | OUTPATIENT
Start: 2018-12-03 | End: 2020-01-13

## 2020-01-13 DIAGNOSIS — N52.8 OTHER MALE ERECTILE DYSFUNCTION: ICD-10-CM

## 2020-01-13 RX ORDER — SILDENAFIL 100 MG/1
100 TABLET, FILM COATED ORAL DAILY PRN
Qty: 4 TABLET | Refills: 3 | Status: SHIPPED | OUTPATIENT
Start: 2020-01-13 | End: 2021-03-12

## 2020-01-13 NOTE — TELEPHONE ENCOUNTER
The patient called back and information has been given. He does not want to make an appt at this time.   Thank you,  Heather Jain   for [facility]

## 2020-01-13 NOTE — TELEPHONE ENCOUNTER
"Routing to PCP for refill if appropriate.   Routing to schedulers for appointment with PCP.     Viagra  Last Written Prescription Date:  12/03/2018  Last Fill Quantity: 4,  # refills: 3   Last office visit: 6/9/2017 with prescribing provider:  Schoen   Future Office Visit:  None  Routing refill request to provider for review/approval because:  Patient needs to be seen because it has been more than 1 year since last office visit.    Requested Prescriptions   Pending Prescriptions Disp Refills     sildenafil (VIAGRA) 100 MG tablet 4 tablet 3     Sig: Take 1 tablet (100 mg) by mouth daily as needed Take 30 min to 4 hours before intercourse.  Never use with nitroglycerin, terazosin or doxazosin.       Erectile Dysfuction Protocol Failed - 1/13/2020  1:18 PM        Failed - Recent (12 mo) or future (30 days) visit within the authorizing provider's specialty     Patient has had an office visit with the authorizing provider or a provider within the authorizing providers department within the previous 12 mos or has a future within next 30 days. See \"Patient Info\" tab in inbasket, or \"Choose Columns\" in Meds & Orders section of the refill encounter.          Passed - Absence of nitrates on medication list        Passed - Absence of Alpha Blockers on Med list        Passed - Medication is active on med list        Passed - Patient is age 18 or older      Vane Patel RN   "

## 2020-11-27 DIAGNOSIS — I10 ESSENTIAL HYPERTENSION: ICD-10-CM

## 2020-11-27 RX ORDER — LISINOPRIL 10 MG/1
TABLET ORAL
Qty: 90 TABLET | Refills: 3 | Status: SHIPPED | OUTPATIENT
Start: 2020-11-27 | End: 2022-02-25

## 2020-11-27 NOTE — TELEPHONE ENCOUNTER
Routing refill request to provider for review/approval because:  Labs not current:  Creatinine, Potassium  Patient needs to be seen because it has been more than 1 year since last office visit.  No current blood pressures.   Break in Medication.     Vane Patel RN

## 2021-03-11 DIAGNOSIS — N52.8 OTHER MALE ERECTILE DYSFUNCTION: ICD-10-CM

## 2021-03-11 NOTE — TELEPHONE ENCOUNTER
Routing refill request to provider for review/approval because:  Patient needs to be seen because it has been more than 1 year since last office visit.    Vane Patel RN

## 2021-03-12 RX ORDER — SILDENAFIL 100 MG/1
TABLET, FILM COATED ORAL
Qty: 4 TABLET | Refills: 3 | Status: SHIPPED | OUTPATIENT
Start: 2021-03-12 | End: 2022-01-25

## 2021-06-09 ENCOUNTER — TELEPHONE (OUTPATIENT)
Dept: SLEEP MEDICINE | Facility: CLINIC | Age: 61
End: 2021-06-09

## 2021-06-09 DIAGNOSIS — G47.33 OSA (OBSTRUCTIVE SLEEP APNEA): Primary | ICD-10-CM

## 2021-06-09 NOTE — TELEPHONE ENCOUNTER
Wilfrid would you be able to renew his c pap supplies? He would like to max out on them for his insurance He has an apt with Anup on 7/28.

## 2021-06-21 ENCOUNTER — OFFICE VISIT (OUTPATIENT)
Dept: SLEEP MEDICINE | Facility: CLINIC | Age: 61
End: 2021-06-21
Payer: COMMERCIAL

## 2021-06-21 VITALS
BODY MASS INDEX: 30.15 KG/M2 | HEART RATE: 65 BPM | DIASTOLIC BLOOD PRESSURE: 90 MMHG | HEIGHT: 72 IN | SYSTOLIC BLOOD PRESSURE: 166 MMHG | WEIGHT: 222.6 LBS | OXYGEN SATURATION: 100 %

## 2021-06-21 DIAGNOSIS — G47.33 OSA (OBSTRUCTIVE SLEEP APNEA): Primary | ICD-10-CM

## 2021-06-21 PROCEDURE — 99203 OFFICE O/P NEW LOW 30 MIN: CPT | Performed by: PHYSICIAN ASSISTANT

## 2021-06-21 ASSESSMENT — MIFFLIN-ST. JEOR: SCORE: 1857.71

## 2021-06-21 NOTE — PROGRESS NOTES
Does Ethan have a CPAP/Bipap?  Yes     Type of mask: full face     [facility]: [place] [phone]    [email or web address]     Neptune Sleep Scale: 3    Obstructive Sleep Apnea - PAP Follow-Up Visit:    No chief complaint on file.      Ethan Espinosa comes in today for follow-up of their severe sleep apnea, managed with CPAP.     No specialty comments available.    Overall, he rates the experience with PAP as 10 (0 poor, 10 great). The mask is comfortable. The mask is not leaking .  He is not snoring with the mask on. He is not having gasp arousals.  He is not having significant oral/nasal dryness. The pressure is comfortable.     His PAP interface is Full Face Mask.    Bedtime is typically 9. Usually it takes about 5 minutes to fall asleep with the mask on. Wake time is typically 3-4AM.  Patient is using PAP therapy 6-7 hours per night. The patient is usually getting 6-7 hours of sleep per night.    He does feel rested in the morning.    Neptune Sleepiness Scale: 3/24      No data recorded    ResMed       Auto-PAP 7 - 12 cmH2O 30 day usage data:    100% of days with > 4 hours of use. 0/30 days with no use.   Average use 4 hours 40  minutes per day.   95%ile Leak 12 L/min.   CPAP 95% pressure 12 cm.   AHI 4.7 events per hour.           Past medical/surgical history, family history, social history, medications and allergies were reviewed.      Problem List:  Patient Active Problem List    Diagnosis Date Noted     Complete tear of right rotator cuff 12/07/2017     Priority: Medium     Impingement syndrome of shoulder region, left 12/07/2017     Priority: Medium     S/P total hip arthroplasty 01/09/2017     Priority: Medium     Primary osteoarthritis of left hip 11/01/2016     Priority: Medium     Osteoarthrosis involving lower leg 07/21/2015     Priority: Medium     Problem list name updated by automated process. Provider to review       ELIZABETH (obstructive sleep apnea)  10/07/2014     Priority: Medium     Essential hypertension 03/22/2013     Priority: Medium     Lyme disease 03/22/2013     Priority: Medium     Low testosterone 03/22/2013     Priority: Medium     CARDIOVASCULAR SCREENING; LDL GOAL LESS THAN 160 10/31/2010     Priority: Medium        There were no vitals taken for this visit.    Impression/Plan:    Severe Sleep apnea. He is Tolerating PAP well, apnea is well controlled. Daytime symptoms are stable.   His machine is old and needs updating. New machine and supplies ordered.    Ethan Espinosa will follow up in about 1 month(s).     Fifteen minutes spent with patient, all of which were spent face-to-face counseling, consulting, coordinating plan of care.      CC:  Schoen, Gregory G,

## 2021-06-21 NOTE — NURSING NOTE
Weight management plan: Patient was referred to their PCP to discuss a diet and exercise plan.     Ethan to follow up with Primary Care provider regarding elevated blood pressure.

## 2021-06-24 ENCOUNTER — TELEPHONE (OUTPATIENT)
Dept: SLEEP MEDICINE | Facility: CLINIC | Age: 61
End: 2021-06-24

## 2022-01-23 DIAGNOSIS — N52.8 OTHER MALE ERECTILE DYSFUNCTION: ICD-10-CM

## 2022-01-25 RX ORDER — SILDENAFIL 100 MG/1
TABLET, FILM COATED ORAL
Qty: 4 TABLET | Refills: 3 | Status: SHIPPED | OUTPATIENT
Start: 2022-01-25 | End: 2023-01-09

## 2022-01-25 NOTE — TELEPHONE ENCOUNTER
Pending Prescriptions:                       Disp   Refills    sildenafil (VIAGRA) 100 MG tablet [Pharmac*4 tabl*3        Sig: TAKE ONE TABLET BY MOUTH ONCE DAILY AS NEEDED 30           MINUTES TO 4 HOURS PRIOR TO INTERCOURSE NEVER USE           WITH NITROGLYCERIN, TERAZOSIN OR DOXAZOSIN    Routing refill request to provider for review/approval because:  Patient needs to be seen because it has been more than 1 year since last office visit., Last OV addressing med was June 2017

## 2022-02-24 DIAGNOSIS — I10 ESSENTIAL HYPERTENSION: ICD-10-CM

## 2022-02-25 RX ORDER — LISINOPRIL 10 MG/1
TABLET ORAL
Qty: 90 TABLET | Refills: 3 | Status: SHIPPED | OUTPATIENT
Start: 2022-02-25 | End: 2023-02-16

## 2023-01-05 DIAGNOSIS — N52.8 OTHER MALE ERECTILE DYSFUNCTION: ICD-10-CM

## 2023-01-06 NOTE — TELEPHONE ENCOUNTER
"Requested Prescriptions   Pending Prescriptions Disp Refills    sildenafil (VIAGRA) 100 MG tablet [Pharmacy Med Name: SILDENAFIL CITRATE 100MG TABS] 4 tablet 3     Sig: TAKE 1 TABLET BY MOUTH DAILY AS NEEDED 30 MINUTES TO 4 HOURS PRIOR TO INTERCOURSE       Erectile Dysfuction Protocol Failed - 1/5/2023 12:00 PM        Failed - Recent (12 mo) or future (30 days) visit within the authorizing provider's specialty     Patient has had an office visit with the authorizing provider or a provider within the authorizing providers department within the previous 12 mos or has a future within next 30 days. See \"Patient Info\" tab in inbasket, or \"Choose Columns\" in Meds & Orders section of the refill encounter.              Passed - Absence of nitrates on medication list        Passed - Absence of Alpha Blockers on Med list        Passed - Medication is active on med list        Passed - Patient is age 18 or older             "

## 2023-01-09 RX ORDER — SILDENAFIL 100 MG/1
TABLET, FILM COATED ORAL
Qty: 4 TABLET | Refills: 3 | Status: SHIPPED | OUTPATIENT
Start: 2023-01-09 | End: 2024-06-03

## 2023-02-15 DIAGNOSIS — I10 ESSENTIAL HYPERTENSION: ICD-10-CM

## 2023-02-15 NOTE — TELEPHONE ENCOUNTER
"Requested Prescriptions   Pending Prescriptions Disp Refills    lisinopril (ZESTRIL) 10 MG tablet [Pharmacy Med Name: LISINOPRIL 10MG TABS] 90 tablet 3     Sig: TAKE ONE TABLET BY MOUTH ONCE DAILY       ACE Inhibitors (Including Combos) Protocol Failed - 2/15/2023 10:26 AM        Failed - Blood pressure under 140/90 in past 12 months     BP Readings from Last 3 Encounters:   06/21/21 (!) 166/90   02/03/18 169/90   06/09/17 (!) 184/92                 Failed - Recent (12 mo) or future (30 days) visit within the authorizing provider's specialty     Patient has had an office visit with the authorizing provider or a provider within the authorizing providers department within the previous 12 mos or has a future within next 30 days. See \"Patient Info\" tab in inbasket, or \"Choose Columns\" in Meds & Orders section of the refill encounter.              Failed - Normal serum creatinine on file in past 12 months     Recent Labs   Lab Test 06/09/17  1500   CR 0.90       Ok to refill medication if creatinine is low          Failed - Normal serum potassium on file in past 12 months     Recent Labs   Lab Test 06/09/17  1500   POTASSIUM 4.2             Passed - Medication is active on med list        Passed - Patient is age 18 or older               "

## 2023-02-15 NOTE — TELEPHONE ENCOUNTER
Pending Prescriptions:                       Disp   Refills    lisinopril (ZESTRIL) 10 MG tablet [Pharmac*90 tab*3        Sig: TAKE ONE TABLET BY MOUTH ONCE DAILY      Support staff:  Please call patient to schedule a visit. (F2F, video, phone, or E Visit) F2F    After 3 attempts to reach patient, please route to provider to review and advise.    Thank you,  Deloris Fernandez,HOLAN, RN

## 2023-02-16 RX ORDER — LISINOPRIL 10 MG/1
TABLET ORAL
Qty: 30 TABLET | Refills: 3 | Status: SHIPPED | OUTPATIENT
Start: 2023-02-16

## 2023-02-16 NOTE — TELEPHONE ENCOUNTER
Prescription refilled but for only 30 tabs with three refills to allow for some time to get in for an appointment.  Lab orders have also been placed to monitor his blood pressure and cholesterol.  Please inform patient of this when scheduling his appointment.   Electronically signed by Greg Schoen, MD

## 2023-02-16 NOTE — TELEPHONE ENCOUNTER
Called and LM for patient to call back. Please relay note below and help schedule.     Mel Arteaga MA

## 2024-06-02 DIAGNOSIS — N52.8 OTHER MALE ERECTILE DYSFUNCTION: ICD-10-CM

## 2024-06-03 RX ORDER — SILDENAFIL 100 MG/1
TABLET, FILM COATED ORAL
Qty: 4 TABLET | Refills: 3 | Status: SHIPPED | OUTPATIENT
Start: 2024-06-03

## 2025-07-01 ENCOUNTER — ANCILLARY PROCEDURE (OUTPATIENT)
Dept: GENERAL RADIOLOGY | Facility: CLINIC | Age: 65
End: 2025-07-01
Attending: ORTHOPAEDIC SURGERY
Payer: COMMERCIAL

## 2025-07-01 ENCOUNTER — OFFICE VISIT (OUTPATIENT)
Dept: ORTHOPEDICS | Facility: CLINIC | Age: 65
End: 2025-07-01
Payer: COMMERCIAL

## 2025-07-01 VITALS — WEIGHT: 229 LBS | RESPIRATION RATE: 18 BRPM | HEIGHT: 72 IN | BODY MASS INDEX: 31.02 KG/M2

## 2025-07-01 DIAGNOSIS — M25.561 RIGHT KNEE PAIN, UNSPECIFIED CHRONICITY: ICD-10-CM

## 2025-07-01 DIAGNOSIS — M17.31 POST-TRAUMATIC OSTEOARTHRITIS OF RIGHT KNEE: ICD-10-CM

## 2025-07-01 DIAGNOSIS — M25.461 EFFUSION OF RIGHT KNEE: ICD-10-CM

## 2025-07-01 DIAGNOSIS — M25.561 RIGHT KNEE PAIN, UNSPECIFIED CHRONICITY: Primary | ICD-10-CM

## 2025-07-01 LAB — CRYSTALS SNV MICRO: NORMAL

## 2025-07-01 PROCEDURE — 87070 CULTURE OTHR SPECIMN AEROBIC: CPT | Performed by: ORTHOPAEDIC SURGERY

## 2025-07-01 PROCEDURE — 87205 SMEAR GRAM STAIN: CPT | Performed by: ORTHOPAEDIC SURGERY

## 2025-07-01 PROCEDURE — 87075 CULTR BACTERIA EXCEPT BLOOD: CPT | Performed by: ORTHOPAEDIC SURGERY

## 2025-07-01 PROCEDURE — 89060 EXAM SYNOVIAL FLUID CRYSTALS: CPT | Performed by: ORTHOPAEDIC SURGERY

## 2025-07-01 PROCEDURE — 20610 DRAIN/INJ JOINT/BURSA W/O US: CPT | Mod: RT | Performed by: ORTHOPAEDIC SURGERY

## 2025-07-01 PROCEDURE — 99203 OFFICE O/P NEW LOW 30 MIN: CPT | Mod: 25 | Performed by: ORTHOPAEDIC SURGERY

## 2025-07-01 RX ORDER — LIDOCAINE HYDROCHLORIDE 10 MG/ML
5 INJECTION, SOLUTION INFILTRATION; PERINEURAL
Status: COMPLETED | OUTPATIENT
Start: 2025-07-01 | End: 2025-07-01

## 2025-07-01 RX ORDER — METHYLPREDNISOLONE ACETATE 80 MG/ML
80 INJECTION, SUSPENSION INTRA-ARTICULAR; INTRALESIONAL; INTRAMUSCULAR; SOFT TISSUE
Status: COMPLETED | OUTPATIENT
Start: 2025-07-01 | End: 2025-07-01

## 2025-07-01 RX ADMIN — LIDOCAINE HYDROCHLORIDE 5 ML: 10 INJECTION, SOLUTION INFILTRATION; PERINEURAL at 16:01

## 2025-07-01 RX ADMIN — METHYLPREDNISOLONE ACETATE 80 MG: 80 INJECTION, SUSPENSION INTRA-ARTICULAR; INTRALESIONAL; INTRAMUSCULAR; SOFT TISSUE at 16:01

## 2025-07-01 NOTE — LETTER
7/1/2025      Ethan Espinosa  [address] [address] [place] [id number]      Dear Colleague,    Thank you for referring your patient, Ethan Espinosa, to the [facility]. Please see a copy of my visit note below.    Ethan Espinosa is a 65 year old male who is seen as self referral for painful right knee.  He had arthroscopy with partial lateral meniscectomy by Dr. Lawrence in 2014.  He reports having off-and-on pain ever since then.  It got worse about 2 months ago.  Yesterday he was cleaning the house and doing a lot of vacuuming and mopping.  He suddenly got significant pain last night.  He has noted significant swelling and pressure in the knee.  He has constant pain rated 6 out of 10.  He has pain with any stairs or putting weight on the right foot.  He has been using ice packs.    X-ray shows significant osteoarthritis of the right knee with lateral narrowing and spurring.  It is not quite bone-on-bone, but is definitely narrowed.    Past Medical History:   Diagnosis Date     Diverticulosis of colon (without mention of hemorrhage)     D/C 07/11/09     Lyme disease        Past Surgical History:   Procedure Laterality Date     ARTHROPLASTY HIP Left 1/9/2017    Procedure: ARTHROPLASTY HIP;  Surgeon: Florentino Lawrence MD;  Location: [facility]     ARTHROSCOPY KNEE WITH LATERAL MENISCECTOMY Right 12/24/2014    Procedure: ARTHROSCOPY KNEE WITH LATERAL MENISCECTOMY;  Surgeon: Florentino Lawrence MD;  Location: [facility]     GENITOURINARY SURGERY      vasectomy     LAPAROSCOPIC HERNIORRHAPHY INGUINAL  2/20/2013    Procedure: LAPAROSCOPIC HERNIORRHAPHY INGUINAL;  laparoscopic preperitoneal left inguinal repair;  Surgeon: Marco Arroyo MD;  Location: [facility]       Family History   Problem Relation Age of Onset     Hypertension Father      Prostate Cancer Father      Prostate Cancer Maternal Grandfather      Prostate Cancer Paternal Grandfather      Eye Disorder Father         cataracts      Respiratory Father                 Social History     Socioeconomic History     Marital status:      Spouse name: Not on file     Number of children: Not on file     Years of education: Not on file     Highest education level: Not on file   Occupational History     Not on file   Tobacco Use     Smoking status: Former     Current packs/day: 0.50     Average packs/day: 0.5 packs/day for 12.0 years (6.0 ttl pk-yrs)     Types: Cigarettes     Smokeless tobacco: Never     Tobacco comments:     1993 quit   Substance and Sexual Activity     Alcohol use: Yes     Comment: 1-2 drinks weekly     Drug use: No     Sexual activity: Yes   Other Topics Concern      Service Yes     Blood Transfusions No     Caffeine Concern No     Occupational Exposure No     Hobby Hazards No     Sleep Concern No     Stress Concern No     Weight Concern No     Special Diet No     Back Care No     Exercise No     Bike Helmet No     Seat Belt Yes     Self-Exams Yes     Parent/sibling w/ CABG, MI or angioplasty before 65F 55M? Not Asked   Social History Narrative     Not on file     Social Drivers of Health     Financial Resource Strain: Not on file   Food Insecurity: Not on file   Transportation Needs: Not on file   Physical Activity: Not on file   Stress: Not on file   Social Connections: Not on file   Interpersonal Safety: Not on file   Housing Stability: Not on file       Current Outpatient Medications   Medication Sig Dispense Refill     lisinopril (ZESTRIL) 10 MG tablet TAKE ONE TABLET BY MOUTH ONCE DAILY 30 tablet 3     MAGNESIUM CITRATE PO        MAGNESIUM GLYCINATE PO        ORDER FOR DME Equipment being ordered: TENS 1 unit marking on an U-100 insulin syringe 0     ORDER FOR DME 1 Device Respironics Auto CPAP @@ 7-12 cm via nasal mask per order of Dr. Headley.       sildenafil (VIAGRA) 100 MG tablet TAKE ONE TABLET BY MOUTH ONCE DAILY AS NEEDED 30 MINUTES TO 4 HOURS PRIOR TO INTERCOURSE 4 tablet 3     Testosterone  (ANDROGEL PUMP) 1.25 GM/ACT (1%) GEL Apply 1gm every morning 1 Month 11     triamcinolone (KENALOG) 0.1 % cream Apply sparingly to affected area three times daily for 14 days or until rash cleared 30 g 3     VITAMIN D, CHOLECALCIFEROL, PO Take 1,000 Units by mouth daily Reported on 2/21/2017         Allergies   Allergen Reactions     Oxycodone Rash     Numerous areas of rash on body.  Painful to patient.     Prednisone Swelling     Rocephin [Ceftriaxone Sodium] Swelling     ANGIOEDEMA       REVIEW OF SYSTEMS:  CONSTITUTIONAL:  NEGATIVE for fever, chills, change in weight, not feeling tired  SKIN:  NEGATIVE for worrisome rashes, no skin lumps, no skin ulcers and no non-healing wounds  EYES:  NEGATIVE for vision changes or irritation.  ENT/MOUTH:  NEGATIVE.  No hearing loss, no hoarseness, no difficulty swallowing.  RESP:  NEGATIVE. No cough or shortness of breath.  CV:  NEGATIVE for chest pain, palpitations or peripheral edema  GI:  NEGATIVE for nausea, abdominal pain, heartburn, or change in bowel habits  :  Negative. No dysuria, no hematuria  MUSCULOSKELETAL:  See HPI above  NEURO:  NEGATIVE . No headaches, no dizziness,  no numbness  ENDOCRINE:  NEGATIVE for temperature intolerance, skin/hair changes  HEME/ALLERGY/IMMUNE:  NEGATIVE for bleeding problems  PSYCHIATRIC:  NEGATIVE. no anxiety, no depression.     Exam:  Vitals: Resp 18   Ht 1.829 m (6')   Wt 103.9 kg (229 lb)   BMI 31.06 kg/m    BMI= Body mass index is 31.06 kg/m .  Constitutional:  healthy, alert and no distress  Neuro: Alert and Oriented x 3, no focal defects.  Psych: Affect normal   Respiratory: Breathing not labored.  Cardiovascular: normal peripheral pulses  Lymph: no adenopathy  Skin: No rashes,worrisome lesions or skin problems  He has a large effusion in the right knee.  He has motion from 10 to 80 degrees limited by the effusion.  He has diffuse tenderness in the knee, but with the lateral joint line.  There is no erythema of the knee,  but he does have some increased warmth.  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  Acute effusion in the knee due to overuse yesterday.  I doubt infection, but with the warmth of the knee it is difficult to rule this out.    Plan: I recommend aspiration of the knee to relieve pressure.  He would like the fluid aspirated.  After sterile prep, the skin was infiltrated with 1% lidocaine.  The fluid was then aspirated with removal of 100 cc serosanguinous fluid.  With the slight cloudiness, I did send fluid to lab for cell count, cultures, crystals.  I did inject the knee with 80 mg Depo-Medrol and lidocaine.  Tubigrip was applied.      Large Joint Injection/Arthocentesis: R knee joint    Date/Time: 7/1/2025 4:01 PM    Performed by: Jersey Haynes MD  Authorized by: Jersey Haynes MD    Indications:  Pain  Needle Size:  22 G  Guidance: landmark guided    Location:  Knee      Medications:  5 mL lidocaine 1 %; 80 mg methylPREDNISolone 80 MG/ML  Aspirate amount (mL):  100  Aspirate:  Blood-tinged  Aspirate analysis: sent for lab analysis    Aspirate analysis comment:  Crystals  Cell count  Anaerobic  Aerobic  Outcome:  Tolerated well, no immediate complications  Procedure discussed: discussed risks, benefits, and alternatives    Consent Given by:  Patient  Timeout: timeout called immediately prior to procedure    Prep: patient was prepped and draped in usual sterile fashion        Again, thank you for allowing me to participate in the care of your patient.        Sincerely,        Jersey Haynes MD    Electronically signed

## 2025-07-01 NOTE — PROGRESS NOTES
Ethan Espinosa is a 65 year old male who is seen as self referral for painful right knee.  He had arthroscopy with partial lateral meniscectomy by Dr. Lawrence in 2014.  He reports having off-and-on pain ever since then.  It got worse about 2 months ago.  Yesterday he was cleaning the house and doing a lot of vacuuming and mopping.  He suddenly got significant pain last night.  He has noted significant swelling and pressure in the knee.  He has constant pain rated 6 out of 10.  He has pain with any stairs or putting weight on the right foot.  He has been using ice packs.    X-ray shows significant osteoarthritis of the right knee with lateral narrowing and spurring.  It is not quite bone-on-bone, but is definitely narrowed.    Past Medical History:   Diagnosis Date    Diverticulosis of colon (without mention of hemorrhage)     D/C 07/11/09    Lyme disease        Past Surgical History:   Procedure Laterality Date    ARTHROPLASTY HIP Left 1/9/2017    Procedure: ARTHROPLASTY HIP;  Surgeon: Florentino Lawrence MD;  Location: [facility]    ARTHROSCOPY KNEE WITH LATERAL MENISCECTOMY Right 12/24/2014    Procedure: ARTHROSCOPY KNEE WITH LATERAL MENISCECTOMY;  Surgeon: Florentino Lawrence MD;  Location: [facility]    GENITOURINARY SURGERY      vasectomy    LAPAROSCOPIC HERNIORRHAPHY INGUINAL  2/20/2013    Procedure: LAPAROSCOPIC HERNIORRHAPHY INGUINAL;  laparoscopic preperitoneal left inguinal repair;  Surgeon: Marco Arroyo MD;  Location: [facility]       Family History   Problem Relation Age of Onset    Hypertension Father     Prostate Cancer Father     Prostate Cancer Maternal Grandfather     Prostate Cancer Paternal Grandfather     Eye Disorder Father         cataracts    Respiratory Father                 Social History     Socioeconomic History    Marital status:      Spouse name: Not on file    Number of children: Not on file    Years of education: Not on file    Highest education level: Not on file    Occupational History    Not on file   Tobacco Use    Smoking status: Former     Current packs/day: 0.50     Average packs/day: 0.5 packs/day for 12.0 years (6.0 ttl pk-yrs)     Types: Cigarettes    Smokeless tobacco: Never    Tobacco comments:     1993 quit   Substance and Sexual Activity    Alcohol use: Yes     Comment: 1-2 drinks weekly    Drug use: No    Sexual activity: Yes   Other Topics Concern     Service Yes    Blood Transfusions No    Caffeine Concern No    Occupational Exposure No    Hobby Hazards No    Sleep Concern No    Stress Concern No    Weight Concern No    Special Diet No    Back Care No    Exercise No    Bike Helmet No    Seat Belt Yes    Self-Exams Yes    Parent/sibling w/ CABG, MI or angioplasty before 65F 55M? Not Asked   Social History Narrative    Not on file     Social Drivers of Health     Financial Resource Strain: Not on file   Food Insecurity: Not on file   Transportation Needs: Not on file   Physical Activity: Not on file   Stress: Not on file   Social Connections: Not on file   Interpersonal Safety: Not on file   Housing Stability: Not on file       Current Outpatient Medications   Medication Sig Dispense Refill    lisinopril (ZESTRIL) 10 MG tablet TAKE ONE TABLET BY MOUTH ONCE DAILY 30 tablet 3    MAGNESIUM CITRATE PO       MAGNESIUM GLYCINATE PO       ORDER FOR DME Equipment being ordered: TENS 1 unit marking on an U-100 insulin syringe 0    ORDER FOR DME 1 Device Respironics Auto CPAP @@ 7-12 cm via nasal mask per order of Dr. Headley.      sildenafil (VIAGRA) 100 MG tablet TAKE ONE TABLET BY MOUTH ONCE DAILY AS NEEDED 30 MINUTES TO 4 HOURS PRIOR TO INTERCOURSE 4 tablet 3    Testosterone (ANDROGEL PUMP) 1.25 GM/ACT (1%) GEL Apply 1gm every morning 1 Month 11    triamcinolone (KENALOG) 0.1 % cream Apply sparingly to affected area three times daily for 14 days or until rash cleared 30 g 3    VITAMIN D, CHOLECALCIFEROL, PO Take 1,000 Units by mouth daily Reported on  2/21/2017         Allergies   Allergen Reactions    Oxycodone Rash     Numerous areas of rash on body.  Painful to patient.    Prednisone Swelling    Rocephin [Ceftriaxone Sodium] Swelling     ANGIOEDEMA       REVIEW OF SYSTEMS:  CONSTITUTIONAL:  NEGATIVE for fever, chills, change in weight, not feeling tired  SKIN:  NEGATIVE for worrisome rashes, no skin lumps, no skin ulcers and no non-healing wounds  EYES:  NEGATIVE for vision changes or irritation.  ENT/MOUTH:  NEGATIVE.  No hearing loss, no hoarseness, no difficulty swallowing.  RESP:  NEGATIVE. No cough or shortness of breath.  CV:  NEGATIVE for chest pain, palpitations or peripheral edema  GI:  NEGATIVE for nausea, abdominal pain, heartburn, or change in bowel habits  :  Negative. No dysuria, no hematuria  MUSCULOSKELETAL:  See HPI above  NEURO:  NEGATIVE . No headaches, no dizziness,  no numbness  ENDOCRINE:  NEGATIVE for temperature intolerance, skin/hair changes  HEME/ALLERGY/IMMUNE:  NEGATIVE for bleeding problems  PSYCHIATRIC:  NEGATIVE. no anxiety, no depression.     Exam:  Vitals: Resp 18   Ht 1.829 m (6')   Wt 103.9 kg (229 lb)   BMI 31.06 kg/m    BMI= Body mass index is 31.06 kg/m .  Constitutional:  healthy, alert and no distress  Neuro: Alert and Oriented x 3, no focal defects.  Psych: Affect normal   Respiratory: Breathing not labored.  Cardiovascular: normal peripheral pulses  Lymph: no adenopathy  Skin: No rashes,worrisome lesions or skin problems  He has a large effusion in the right knee.  He has motion from 10 to 80 degrees limited by the effusion.  He has diffuse tenderness in the knee, but with the lateral joint line.  There is no erythema of the knee, but he does have some increased warmth.  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  Acute effusion in the knee due to overuse yesterday.  I doubt infection, but with the warmth of  the knee it is difficult to rule this out.    Plan: I recommend aspiration of the knee to relieve pressure.  He would like the fluid aspirated.  After sterile prep, the skin was infiltrated with 1% lidocaine.  The fluid was then aspirated with removal of 100 cc serosanguinous fluid.  With the slight cloudiness, I did send fluid to lab for cell count, cultures, crystals.  I did inject the knee with 80 mg Depo-Medrol and lidocaine.  Tubigrip was applied.

## 2025-07-01 NOTE — PROGRESS NOTES
Large Joint Injection/Arthocentesis: R knee joint    Date/Time: 7/1/2025 4:01 PM    Performed by: Jersey Haynes MD  Authorized by: Jersey Haynes MD    Indications:  Pain  Needle Size:  22 G  Guidance: landmark guided    Location:  Knee      Medications:  5 mL lidocaine 1 %; 80 mg methylPREDNISolone 80 MG/ML  Aspirate amount (mL):  100  Aspirate:  Blood-tinged  Aspirate analysis: sent for lab analysis    Aspirate analysis comment:  Crystals  Cell count  Anaerobic  Aerobic  Outcome:  Tolerated well, no immediate complications  Procedure discussed: discussed risks, benefits, and alternatives    Consent Given by:  Patient  Timeout: timeout called immediately prior to procedure    Prep: patient was prepped and draped in usual sterile fashion

## 2025-07-01 NOTE — PATIENT INSTRUCTIONS
You have had a steroid injection today.  For the first 2 hours there will likely be some numbing in the joint from the lidocaine.  This is a good sign, indicating that the injection is in the right place.  In 2 hours the lidocaine will wear off, and the joint will hurt like you had a shot.  Each day the cortisone makes it feel better.  It reaches peak effect in 2 weeks.  We expect it to last for 3 months.  You may resume regular activity when you feel ready.  If you are diabetic, your glucoses will be quite high for several days.    100 cc of blood tinged fluid removed, fluid sent to lab for labs    Use compression, OK to ice, Tylenol and ibuprofen

## 2025-07-03 LAB
BACTERIA SNV CULT: NORMAL
BACTERIA SNV CULT: NORMAL
GRAM STAIN RESULT: NORMAL
GRAM STAIN RESULT: NORMAL

## 2025-07-06 LAB
BACTERIA SNV CULT: NO GROWTH
GRAM STAIN RESULT: NORMAL
GRAM STAIN RESULT: NORMAL

## 2025-07-10 LAB — BACTERIA SNV CULT: NORMAL

## 2025-07-15 LAB — BACTERIA SNV CULT: NORMAL
